# Patient Record
Sex: MALE | Race: BLACK OR AFRICAN AMERICAN | NOT HISPANIC OR LATINO | Employment: FULL TIME | URBAN - METROPOLITAN AREA
[De-identification: names, ages, dates, MRNs, and addresses within clinical notes are randomized per-mention and may not be internally consistent; named-entity substitution may affect disease eponyms.]

---

## 2017-06-13 ENCOUNTER — OFFICE VISIT (OUTPATIENT)
Dept: FAMILY MEDICINE CLINIC | Facility: CLINIC | Age: 52
End: 2017-06-13

## 2017-06-13 VITALS
BODY MASS INDEX: 34.39 KG/M2 | SYSTOLIC BLOOD PRESSURE: 120 MMHG | HEART RATE: 62 BPM | HEIGHT: 74 IN | WEIGHT: 268 LBS | RESPIRATION RATE: 16 BRPM | DIASTOLIC BLOOD PRESSURE: 70 MMHG | TEMPERATURE: 98.4 F

## 2017-06-13 DIAGNOSIS — Z12.5 SCREENING FOR PROSTATE CANCER: ICD-10-CM

## 2017-06-13 DIAGNOSIS — N18.4 CKD (CHRONIC KIDNEY DISEASE) STAGE 4, GFR 15-29 ML/MIN (HCC): ICD-10-CM

## 2017-06-13 DIAGNOSIS — M10.00 IDIOPATHIC GOUT, UNSPECIFIED CHRONICITY, UNSPECIFIED SITE: ICD-10-CM

## 2017-06-13 DIAGNOSIS — I10 ESSENTIAL HYPERTENSION: Primary | ICD-10-CM

## 2017-06-13 PROBLEM — M10.9 GOUT: Status: ACTIVE | Noted: 2017-06-13

## 2017-06-13 PROCEDURE — 99203 OFFICE O/P NEW LOW 30 MIN: CPT | Performed by: FAMILY MEDICINE

## 2017-06-13 RX ORDER — ALLOPURINOL 100 MG/1
100 TABLET ORAL DAILY
Qty: 30 TABLET | Refills: 5 | Status: SHIPPED | OUTPATIENT
Start: 2017-06-13 | End: 2021-01-26

## 2017-06-13 RX ORDER — NEBIVOLOL HYDROCHLORIDE 10 MG/1
10 TABLET ORAL DAILY
Qty: 30 TABLET | Refills: 5 | Status: SHIPPED | OUTPATIENT
Start: 2017-06-13 | End: 2017-12-19

## 2017-06-13 RX ORDER — ALLOPURINOL 100 MG/1
100 TABLET ORAL DAILY
Refills: 0 | COMMUNITY
Start: 2017-05-17 | End: 2017-06-13 | Stop reason: SDUPTHER

## 2017-06-13 RX ORDER — AMLODIPINE BESYLATE 10 MG/1
10 TABLET ORAL DAILY
Refills: 0 | COMMUNITY
Start: 2017-04-27 | End: 2017-06-13 | Stop reason: SDUPTHER

## 2017-06-13 RX ORDER — NEBIVOLOL HYDROCHLORIDE 10 MG/1
TABLET ORAL
Refills: 0 | COMMUNITY
Start: 2017-04-27 | End: 2017-06-13 | Stop reason: SDUPTHER

## 2017-06-13 RX ORDER — AMLODIPINE BESYLATE 10 MG/1
10 TABLET ORAL DAILY
Qty: 30 TABLET | Refills: 5 | Status: SHIPPED | OUTPATIENT
Start: 2017-06-13 | End: 2021-05-24 | Stop reason: SDUPTHER

## 2017-06-13 NOTE — PROGRESS NOTES
"Subjective   Alonso Torres is a 51 y.o. male.     CC: Establishment of Care    History of Present Illness     Chief Complaint:   Chief Complaint   Patient presents with   • Hypertension     NEW PATIENT  - NERVOUS    • Gout       Alonso Torres 51 y.o. male [facility] > [facility] years[facility] for Medical Management of the below listed issues and medication refills.  he has a history of   Patient Active Problem List   Diagnosis   • Hypertension   • Gout   • CKD (chronic kidney disease) stage 4, GFR 15-29 ml/min   .  Since the last visit , he has overall felt well.  he has been compliant with   Current Outpatient Prescriptions:   •  allopurinol (ZYLOPRIM) 100 MG tablet, Take 1 tablet by mouth Daily., Disp: 30 tablet, Rfl: 5  •  amLODIPine (NORVASC) 10 MG tablet, Take 1 tablet by mouth Daily., Disp: 30 tablet, Rfl: 5  •  BYSTOLIC 10 MG tablet, Take 1 tablet by mouth Daily., Disp: 30 tablet, Rfl: 5.  he denies medication side effects.    He has been getting his refills with his nephrologist, Dr. Pearson.    He has known, late-stage renal disease and has seen [facility] several times for abnormal [place] Lambda light chain ratio.    All of the chronic condition(s) listed above are stable w/o issues.    /70  Pulse 62  Temp 98.4 °F (36.9 °C) (Oral)   Resp 16  Ht 74\" (188 cm)  Wt 268 lb (122 kg)  BMI 34.41 kg/m2    No results found for this or any previous visit.      The following portions of the patient's history were reviewed and updated as appropriate: allergies, current medications, past family history, past medical history, past social history, past surgical history and problem list.    Review of Systems   Constitutional: Negative for activity change, chills, fatigue and fever.   Respiratory: Negative for cough and shortness of breath.    Cardiovascular: Negative for chest pain and palpitations.   Gastrointestinal: Negative for abdominal pain.   Endocrine: Negative for cold " intolerance.   Psychiatric/Behavioral: Negative for behavioral problems and dysphoric mood. The patient is not nervous/anxious.        Objective   Physical Exam   Constitutional: He appears well-developed and well-nourished.   Neck: Neck supple. No thyromegaly present.   Cardiovascular: Normal rate and regular rhythm.    No murmur heard.  Pulmonary/Chest: Effort normal and breath sounds normal.   Abdominal: Bowel sounds are normal.   Psychiatric: He has a normal mood and affect. His behavior is normal.   Nursing note and vitals reviewed.      Assessment/Plan   Alonso was seen today for hypertension and gout.    Diagnoses and all orders for this visit:    Essential hypertension  -     Comprehensive metabolic panel  -     Lipid panel  -     CBC and Differential  -     TSH  -     BYSTOLIC 10 MG tablet; Take 1 tablet by mouth Daily.  -     amLODIPine (NORVASC) 10 MG tablet; Take 1 tablet by mouth Daily.    Idiopathic gout, unspecified chronicity, unspecified site  -     Uric acid  -     allopurinol (ZYLOPRIM) 100 MG tablet; Take 1 tablet by mouth Daily.    CKD (chronic kidney disease) stage 4, GFR 15-29 ml/min  -     Comprehensive metabolic panel    Screening for prostate cancer  -     PSA

## 2017-12-07 LAB
ALBUMIN SERPL-MCNC: 4.7 G/DL (ref 3.5–5.5)
ALBUMIN/GLOB SERPL: 1.5 {RATIO} (ref 1.2–2.2)
ALP SERPL-CCNC: 69 IU/L (ref 39–117)
ALT SERPL-CCNC: 10 IU/L (ref 0–44)
AST SERPL-CCNC: 12 IU/L (ref 0–40)
BASOPHILS # BLD AUTO: 0 X10E3/UL (ref 0–0.2)
BASOPHILS NFR BLD AUTO: 1 %
BILIRUB SERPL-MCNC: 0.5 MG/DL (ref 0–1.2)
BUN SERPL-MCNC: 62 MG/DL (ref 6–24)
BUN/CREAT SERPL: 8 (ref 9–20)
CALCIUM SERPL-MCNC: 10.2 MG/DL (ref 8.7–10.2)
CHLORIDE SERPL-SCNC: 102 MMOL/L (ref 96–106)
CHOLEST SERPL-MCNC: 165 MG/DL (ref 100–199)
CO2 SERPL-SCNC: 22 MMOL/L (ref 18–29)
CREAT SERPL-MCNC: 7.84 MG/DL (ref 0.76–1.27)
EOSINOPHIL # BLD AUTO: 0.2 X10E3/UL (ref 0–0.4)
EOSINOPHIL NFR BLD AUTO: 4 %
ERYTHROCYTE [DISTWIDTH] IN BLOOD BY AUTOMATED COUNT: 14.7 % (ref 12.3–15.4)
GFR SERPLBLD CREATININE-BSD FMLA CKD-EPI: 7 ML/MIN/1.73
GFR SERPLBLD CREATININE-BSD FMLA CKD-EPI: 8 ML/MIN/1.73
GLOBULIN SER CALC-MCNC: 3.1 G/DL (ref 1.5–4.5)
GLUCOSE SERPL-MCNC: 99 MG/DL (ref 65–99)
HCT VFR BLD AUTO: 35.4 % (ref 37.5–51)
HDLC SERPL-MCNC: 46 MG/DL
HGB BLD-MCNC: 12.1 G/DL (ref 13–17.7)
IMM GRANULOCYTES # BLD: 0 X10E3/UL (ref 0–0.1)
IMM GRANULOCYTES NFR BLD: 0 %
LDLC SERPL CALC-MCNC: 106 MG/DL (ref 0–99)
LYMPHOCYTES # BLD AUTO: 1.6 X10E3/UL (ref 0.7–3.1)
LYMPHOCYTES NFR BLD AUTO: 28 %
MCH RBC QN AUTO: 28.5 PG (ref 26.6–33)
MCHC RBC AUTO-ENTMCNC: 34.2 G/DL (ref 31.5–35.7)
MCV RBC AUTO: 83 FL (ref 79–97)
MONOCYTES # BLD AUTO: 0.6 X10E3/UL (ref 0.1–0.9)
MONOCYTES NFR BLD AUTO: 10 %
NEUTROPHILS # BLD AUTO: 3.3 X10E3/UL (ref 1.4–7)
NEUTROPHILS NFR BLD AUTO: 57 %
PLATELET # BLD AUTO: 242 X10E3/UL (ref 150–379)
POTASSIUM SERPL-SCNC: 4.4 MMOL/L (ref 3.5–5.2)
PROT SERPL-MCNC: 7.8 G/DL (ref 6–8.5)
PSA SERPL-MCNC: 3.5 NG/ML (ref 0–4)
RBC # BLD AUTO: 4.25 X10E6/UL (ref 4.14–5.8)
SODIUM SERPL-SCNC: 141 MMOL/L (ref 134–144)
TRIGL SERPL-MCNC: 64 MG/DL (ref 0–149)
TSH SERPL DL<=0.005 MIU/L-ACNC: 2.11 UIU/ML (ref 0.45–4.5)
URATE SERPL-MCNC: 10.6 MG/DL (ref 3.7–8.6)
VLDLC SERPL CALC-MCNC: 13 MG/DL (ref 5–40)
WBC # BLD AUTO: 5.7 X10E3/UL (ref 3.4–10.8)

## 2017-12-19 ENCOUNTER — OFFICE VISIT (OUTPATIENT)
Dept: SURGERY | Facility: CLINIC | Age: 52
End: 2017-12-19

## 2017-12-19 VITALS — HEIGHT: 74 IN | OXYGEN SATURATION: 98 % | BODY MASS INDEX: 33.14 KG/M2 | HEART RATE: 71 BPM | WEIGHT: 258.2 LBS

## 2017-12-19 DIAGNOSIS — Z12.11 SCREENING FOR COLON CANCER: Primary | ICD-10-CM

## 2017-12-19 PROCEDURE — 99203 OFFICE O/P NEW LOW 30 MIN: CPT | Performed by: SURGERY

## 2017-12-19 RX ORDER — METOPROLOL SUCCINATE 100 MG/1
100 TABLET, EXTENDED RELEASE ORAL DAILY
Refills: 0 | COMMUNITY
Start: 2017-11-03 | End: 2021-02-17

## 2017-12-19 RX ORDER — HYDRALAZINE HYDROCHLORIDE 10 MG/1
10 TABLET, FILM COATED ORAL DAILY
Refills: 0 | COMMUNITY
Start: 2017-11-03 | End: 2021-01-26 | Stop reason: ALTCHOICE

## 2017-12-19 RX ORDER — CEFAZOLIN SODIUM 2 G/100ML
2 INJECTION, SOLUTION INTRAVENOUS ONCE
Status: CANCELLED | OUTPATIENT
Start: 2018-03-01 | End: 2018-03-01

## 2017-12-19 RX ORDER — AMOXICILLIN 250 MG
1 CAPSULE ORAL DAILY
Qty: 30 TABLET | Refills: 1 | Status: SHIPPED | OUTPATIENT
Start: 2017-12-19 | End: 2018-12-19

## 2017-12-19 RX ORDER — POLYETHYLENE GLYCOL 3350 17 G/17G
17 POWDER, FOR SOLUTION ORAL DAILY PRN
Qty: 60 EACH | Refills: 2 | Status: SHIPPED | OUTPATIENT
Start: 2017-12-19 | End: 2021-02-17

## 2017-12-19 RX ORDER — SODIUM BICARBONATE 650 MG/1
650 TABLET ORAL DAILY
COMMUNITY
Start: 2017-08-28 | End: 2021-01-26 | Stop reason: ALTCHOICE

## 2017-12-19 RX ORDER — FLECAINIDE ACETATE 100 MG/1
100 TABLET ORAL DAILY PRN
COMMUNITY
End: 2021-01-26 | Stop reason: ALTCHOICE

## 2017-12-19 RX ORDER — CALCITRIOL 0.25 UG/1
1 CAPSULE, LIQUID FILLED ORAL DAILY
Refills: 0 | COMMUNITY
Start: 2017-12-16 | End: 2021-01-26 | Stop reason: ALTCHOICE

## 2017-12-19 NOTE — PROGRESS NOTES
Chief Complaint   Patient presents with   • PD Cath Consult       Subjective      Alonso Torres is a 52 y.o. male who is referred by Ailyn Pearson,* to be evaluated for peritoneal dialysis catheter placement. Patient has CKD and  is not on dialysis. Patient does not have a AV access.  Patient has not had a recent intraabdominal infection. She reports having daily bowel movements That are hard in consistency.  He has history of constipation. Patient did have a Colonoscopy in 2008 that was normal.  History having chronic kidney disease approximately 20 years ago.  He was diagnosed with hypertension 3 years after.  He has been follow-up by nephrologist for the past 20 years.  He denies any drugs used.  He does not recall having any urinary tract infections although reports having an episode of brownish ejaculation associated with pain.  This was treated with antibiotics for possible infection.  His pain resolved after that.  Denies any other complaints and does not have any abdominal wall bulging.  He has history of paroxysmal atrial fibrillation that as per patient happens every 7-10 years.  He was on Coumadin in the past but is not right now.  There is family history of colon cancer      Past Medical History:   Diagnosis Date   • CKD (chronic kidney disease)     stage IV   • Gout    • H/O complete eye exam due   • Hypertension    • PAF (paroxysmal atrial fibrillation)    • Vitamin D deficiency        Past Surgical History:   Procedure Laterality Date   • CARDIAC ABLATION  2017   • COLONOSCOPY  2008   • HERNIA REPAIR      age 15       Current Outpatient Prescriptions:   •  allopurinol (ZYLOPRIM) 100 MG tablet, Take 1 tablet by mouth Daily., Disp: 30 tablet, Rfl: 5  •  amLODIPine (NORVASC) 10 MG tablet, Take 1 tablet by mouth Daily., Disp: 30 tablet, Rfl: 5  •  calcitriol (ROCALTROL) 0.25 MCG capsule, Take 1 capsule by mouth Daily., Disp: , Rfl: 0  •  flecainide (TAMBOCOR) 100 MG tablet, Take 100 mg by mouth  Daily As Needed., Disp: , Rfl:   •  hydrALAZINE (APRESOLINE) 10 MG tablet, Take 10 mg by mouth Daily., Disp: , Rfl: 0  •  metoprolol succinate XL (TOPROL-XL) 100 MG 24 hr tablet, Take 100 mg by mouth Daily., Disp: , Rfl: 0  •  sodium bicarbonate 650 MG tablet, Take 650 mg by mouth Daily., Disp: , Rfl:   •  polyethylene glycol (MIRALAX) packet, Take 17 g by mouth Daily As Needed (for constipation)., Disp: 60 each, Rfl: 2  •  senna-docusate (PERICOLACE) 8.6-50 MG per tablet, Take 1 tablet by mouth Daily., Disp: 30 tablet, Rfl: 1    Allergies   Allergen Reactions   • Nsaids      Due to CKD       Family History   Problem Relation Age of Onset   • Depression Mother    • Mental illness Mother    • Colon cancer Father    • Kidney disease Other    • Aortic aneurysm Neg Hx        Social History     Social History   • Marital status:      Spouse name: N/A   • Number of children: N/A   • Years of education: N/A     Occupational History   • Not on file.     Social History Main Topics   • Smoking status: Never Smoker   • Smokeless tobacco: Never Used   • Alcohol use No   • Drug use: Defer   • Sexual activity: Defer     Other Topics Concern   • Not on file     Social History Narrative         REVIEW OF SYSTEMS    Review of Systems   Constitutional: Negative for activity change and chills.   HENT: Negative for congestion and ear discharge.    Respiratory: Negative for apnea, cough and choking.    Cardiovascular: Negative for chest pain.   Gastrointestinal: Negative for abdominal distention and abdominal pain.   Endocrine: Negative for cold intolerance and heat intolerance.   Genitourinary: Positive for dysuria. Negative for flank pain.   Musculoskeletal: Negative for arthralgias and back pain.   Skin: Negative for color change and pallor.   Neurological: Negative for dizziness and headaches.   Hematological: Negative for adenopathy. Does not bruise/bleed easily.   Psychiatric/Behavioral: Negative for agitation and  "self-injury.       Physical Examination  Pulse 71  Ht 188 cm (74\")  Wt 117 kg (258 lb 3.2 oz)  SpO2 98%  BMI 33.15 kg/m2  Body mass index is 33.15 kg/(m^2).  Physical Exam   Constitutional: He is oriented to person, place, and time. He appears well-developed and well-nourished.   HENT:   Head: Normocephalic.   Eyes: No scleral icterus.   Neck: Normal range of motion. Neck supple.   Cardiovascular: Normal rate and regular rhythm.    Pulmonary/Chest: Effort normal and breath sounds normal.   Abdominal: Soft. Bowel sounds are normal. He exhibits no distension and no mass. There is no tenderness. No hernia.   Musculoskeletal: Normal range of motion.   Neurological: He is alert and oriented to person, place, and time.   Skin: Skin is warm and dry.   Psychiatric: He has a normal mood and affect. His behavior is normal.   The inguinal canal bilaterally feels weak but there is no evidence of hernias    Assessment:   Alonso Torres is a 52 y.o. male with CKD that is not on dialysis and will need peritoneal dialysis catheter placement. He has family history of colon cancer and is due for a colonoscopy.  He has constipation    The procedure was explained in detail to the patient including risks and benefits.  The benefits including the possibility of having dialysis at home without the side effects of the hemodialysis.  The risks including but not limited to catheter dislodgment, obstruction, malfunction, bleeding, infection and possible injury to surrounding organs during peritoneal access. He is interested in proceeding with laparoscopic placement of peritoneal dialysis catheter. The patient understands that the catheter will not be used for dialysis for a period of approximately 2 weeks after its placement and that during this time they should not shower until the exit site is completely healed. The patient underwent at least one training session with the peritoneal dialysis nurse and their house was evaluated and is " ready for the peritoneal dialysis. Patient verbalized understanding and agreed with the plan. All questions were answered at this time.      Plan:     - Patient will need to have colonoscopy performed.  He will need to have a bowel preparation    Indications, risks and procedure were discussed with Him including but not limited to bleeding, infection, possibility of perforation and possible polypectomy. All of their questions were answered and  would like to proceed with the above recommendations.  Any additional follow-up will be discussed with Him after the results have been reviewed.    - Constipation: Start bowel regimen with Sosa-Colace and Metamucil daily.  He should take MiraLAX as needed    - Laparoscopic peritoneal dialysis catheter placement, possible open.  The patient will need to discuss this with Dr. Pearson before deciding.  He may need to have inguinal hernia repair if any is found intraoperatively  - Preparation for surgery orders have been placed  - Surgery scheduling.     Orders Placed This Encounter   Procedures   • Basic metabolic panel   • Follow anesthesia standing orders.   • Obtain informed consent     Order Specific Question:   Informed Consent Given For     Answer:   Laparoscopic peritoneal dialysis catheter placement   • CBC and Differential     Order Specific Question:   Manual Differential     Answer:   No         Morgan Coyne MD  General, Minimally Invasive and Endoscopic Surgery  [facility]    [address] [place] [address], [address]  [place], [place], [address]  P: [phone]  F: [phone]

## 2018-02-01 ENCOUNTER — APPOINTMENT (OUTPATIENT)
Dept: PREADMISSION TESTING | Facility: HOSPITAL | Age: 53
End: 2018-02-01

## 2018-02-26 ENCOUNTER — TELEPHONE (OUTPATIENT)
Dept: SURGERY | Facility: CLINIC | Age: 53
End: 2018-02-26

## 2018-02-26 NOTE — TELEPHONE ENCOUNTER
Notified patient on 2/23 & 2/26 that he didn't have any surgical benefits for a screening c-scope & that if I didn't hear back from him to move forward I would cancel it

## 2018-03-07 DIAGNOSIS — M10.00 IDIOPATHIC GOUT, UNSPECIFIED CHRONICITY, UNSPECIFIED SITE: ICD-10-CM

## 2018-03-07 RX ORDER — ALLOPURINOL 100 MG/1
TABLET ORAL
Qty: 30 TABLET | Refills: 5 | OUTPATIENT
Start: 2018-03-07

## 2021-01-26 ENCOUNTER — OFFICE VISIT (OUTPATIENT)
Dept: SURGERY | Facility: CLINIC | Age: 56
End: 2021-01-26

## 2021-01-26 VITALS — BODY MASS INDEX: 30.13 KG/M2 | WEIGHT: 234.8 LBS | HEIGHT: 74 IN

## 2021-01-26 DIAGNOSIS — Z12.11 SCREENING FOR COLON CANCER: Primary | ICD-10-CM

## 2021-01-26 DIAGNOSIS — Z99.2 ESRD ON HEMODIALYSIS (HCC): ICD-10-CM

## 2021-01-26 DIAGNOSIS — N18.6 ESRD ON HEMODIALYSIS (HCC): ICD-10-CM

## 2021-01-26 PROCEDURE — 99203 OFFICE O/P NEW LOW 30 MIN: CPT | Performed by: SURGERY

## 2021-01-26 RX ORDER — SEVELAMER CARBONATE 800 MG/1
800 TABLET, FILM COATED ORAL
COMMUNITY
Start: 2020-10-20

## 2021-01-26 NOTE — PROGRESS NOTES
Chief Complaint   Patient presents with   • PD CATHETER       Subjective      Alonos Torres . is a 55 y.o. male who is referred by Dr. Vides to be evaluated for peritoneal dialysis catheter placement. Patient has ESRD secondary to hypertensive nephrosclerosis and  is on dialysis. He gets dialysis on Monday, Wednesday and Friday since September 2020. Patient does not have a AV access as he is getting dialysis through a right IJ catheter.  Patient has not had a recent intraabdominal infection. She reports having regular bowel movements.  Patient last colonoscopy was more than 10 years ago    Home Evaluation: YES    Past Medical History:   Diagnosis Date   • CKD (chronic kidney disease)     stage IV   • Gout    • Hypertension    • PAF (paroxysmal atrial fibrillation) (CMS/HCC)    • Vitamin D deficiency        Past Surgical History:   Procedure Laterality Date   • CARDIAC ABLATION  2017   • COLONOSCOPY  2008   • DIALYSIS FISTULA CREATION     • INGUINAL HERNIA REPAIR Bilateral     age 15         Current Outpatient Medications:   •  amLODIPine (NORVASC) 10 MG tablet, Take 1 tablet by mouth Daily., Disp: 30 tablet, Rfl: 5  •  metoprolol succinate XL (TOPROL-XL) 100 MG 24 hr tablet, Take 100 mg by mouth Daily., Disp: , Rfl: 0  •  sevelamer (RENVELA) 800 MG tablet, Take 800 mg by mouth 3 (Three) Times a Day With Meals., Disp: , Rfl:   •  polyethylene glycol (MIRALAX) packet, Take 17 g by mouth Daily As Needed (for constipation)., Disp: 60 each, Rfl: 2    Allergies   Allergen Reactions   • Nsaids Other (See Comments)     Due to CKD       Family History   Problem Relation Age of Onset   • Depression Mother    • Mental illness Mother    • Colon cancer Father    • Kidney disease Other    • Aortic aneurysm Neg Hx        Social History     Socioeconomic History   • Marital status:      Spouse name: Not on file   • Number of children: Not on file   • Years of education: Not on file   • Highest education level: Not on file  "  Tobacco Use   • Smoking status: Never Smoker   • Smokeless tobacco: Never Used   Substance and Sexual Activity   • Alcohol use: No   • Drug use: Defer   • Sexual activity: Defer         REVIEW OF SYSTEMS    Review of Systems   Constitutional: Negative for activity change and fatigue.   HENT: Negative for congestion and rhinorrhea.    Eyes: Negative for discharge and itching.   Respiratory: Negative for apnea and choking.    Cardiovascular: Negative for chest pain and leg swelling.   Gastrointestinal: Negative for abdominal distention and constipation.   Endocrine: Negative for cold intolerance and heat intolerance.   Genitourinary: Negative for difficulty urinating and hematuria.   Musculoskeletal: Positive for arthralgias. Negative for gait problem.   Skin: Negative for color change and pallor.   Allergic/Immunologic: Negative for environmental allergies and food allergies.   Neurological: Negative for dizziness and light-headedness.   Hematological: Negative for adenopathy. Does not bruise/bleed easily.   Psychiatric/Behavioral: Negative for agitation and confusion.       Physical Examination  Ht 188 cm (74\")   Wt 107 kg (234 lb 12.8 oz)   BMI 30.15 kg/m²   Body mass index is 30.15 kg/m².  Physical Exam  Constitutional:       Appearance: He is normal weight.   HENT:      Head: Normocephalic and atraumatic.      Nose: Nose normal.      Mouth/Throat:      Pharynx: Oropharynx is clear.   Eyes:      General: No scleral icterus.     Conjunctiva/sclera: Conjunctivae normal.   Neck:      Musculoskeletal: Normal range of motion and neck supple.   Cardiovascular:      Rate and Rhythm: Normal rate and regular rhythm.      Pulses: Normal pulses.      Heart sounds: Normal heart sounds.   Pulmonary:      Effort: Pulmonary effort is normal.      Breath sounds: Normal breath sounds.   Abdominal:      General: Abdomen is flat. Bowel sounds are normal.      Palpations: There is no mass.      Hernia: No hernia is present.      " Comments: Bilateral groin incisions, no hernias   Genitourinary:     Penis: Normal.    Musculoskeletal: Normal range of motion.   Skin:     General: Skin is warm and dry.   Neurological:      General: No focal deficit present.      Mental Status: He is alert.   Psychiatric:         Mood and Affect: Mood normal.         Behavior: Behavior normal.       Labs 12/31/2020: Sodium 135, potassium 5.3, creatinine 11.7, BUN 39  Hemoglobin 12.9, white blood cell count 5.3    Assessment:   Alonso Torres Sr. is a 55 y.o. male with ESRD due to  hypertensive nephrosclerosis that is on dialysis and will need peritoneal dialysis catheter placement.  He is due for colonoscopy      The procedure was explained in detail to the patient including risks and benefits.  The benefits including the possibility of having dialysis at home without the side effects of the hemodialysis.  The risks including but not limited to catheter dislodgment, obstruction, malfunction, bleeding, infection and possible injury to surrounding organs during peritoneal access. He is interested in proceeding with laparoscopic placement of peritoneal dialysis catheter. The patient understands that the catheter will not be used for dialysis for a period of approximately 2 weeks after its placement and that during this time they should not shower until the exit site is completely healed. The patient underwent at least one training session with the peritoneal dialysis nurse and their house was evaluated and is ready for the peritoneal dialysis. Patient verbalized understanding and agreed with the plan. All questions were answered at this time.    Indications, risks and colonoscopy procedure were discussed with Him including but not limited to bleeding, infection, possibility of perforation and possible polypectomy. All of their questions were answered and  would like to proceed with the above recommendations.  Any additional follow-up will be discussed with Him after  the results have been reviewed.      Plan:     -Screening colonoscopy  - Laparoscopic peritoneal dialysis catheter placement, possible open on a later today  - Preparation for surgery orders have been placed  - Surgery scheduling.     Orders Placed This Encounter   Procedures   • Basic metabolic panel     Standing Status:   Future     Standing Expiration Date:   1/27/2022   • Follow Anesthesia Guidelines / Protocol     Standing Status:   Future   • Obtain Informed Consent     Standing Status:   Future     Order Specific Question:   Informed Consent Given For     Answer:   COLONOSCOPY   • Follow anesthesia standing orders.   • Obtain informed consent     Order Specific Question:   Informed Consent Given For     Answer:   Laparoscopic peritoneal dialysis catheter placement   • Provide NPO Instructions to Patient     Standing Status:   Future   • Chlorhexidine Skin Prep     Chlorhexidine Skin Prep and Instructions For All Patients Having A Procedure Requiring an Outward Incision if Not Allergic. If Allergic, Give Antibacterial Skin Wipes and Instructions. Do Not Use For Facial Cases or on Any Mucus Membranes.     Standing Status:   Future       Morgan Coyne MD  General, Minimally Invasive and Endoscopic Surgery  [facility]    [address], [address]  [place], [place], [address]  P: [phone]  F: [phone]

## 2021-02-05 ENCOUNTER — TRANSCRIBE ORDERS (OUTPATIENT)
Dept: SLEEP MEDICINE | Facility: HOSPITAL | Age: 56
End: 2021-02-05

## 2021-02-05 DIAGNOSIS — Z01.818 OTHER SPECIFIED PRE-OPERATIVE EXAMINATION: Primary | ICD-10-CM

## 2021-02-16 ENCOUNTER — LAB (OUTPATIENT)
Dept: LAB | Facility: HOSPITAL | Age: 56
End: 2021-02-16

## 2021-02-16 DIAGNOSIS — Z01.818 OTHER SPECIFIED PRE-OPERATIVE EXAMINATION: ICD-10-CM

## 2021-02-17 ENCOUNTER — TRANSCRIBE ORDERS (OUTPATIENT)
Dept: ADMINISTRATIVE | Facility: HOSPITAL | Age: 56
End: 2021-02-17

## 2021-02-17 ENCOUNTER — LAB (OUTPATIENT)
Dept: LAB | Facility: HOSPITAL | Age: 56
End: 2021-02-17

## 2021-02-17 DIAGNOSIS — Z01.818 OTHER SPECIFIED PRE-OPERATIVE EXAMINATION: Primary | ICD-10-CM

## 2021-02-17 DIAGNOSIS — Z01.818 OTHER SPECIFIED PRE-OPERATIVE EXAMINATION: ICD-10-CM

## 2021-02-17 LAB — SARS-COV-2 ORF1AB RESP QL NAA+PROBE: NOT DETECTED

## 2021-02-17 PROCEDURE — U0005 INFEC AGEN DETEC AMPLI PROBE: HCPCS

## 2021-02-17 PROCEDURE — U0004 COV-19 TEST NON-CDC HGH THRU: HCPCS

## 2021-02-17 PROCEDURE — C9803 HOPD COVID-19 SPEC COLLECT: HCPCS

## 2021-02-17 RX ORDER — METOPROLOL SUCCINATE 50 MG/1
50 TABLET, EXTENDED RELEASE ORAL AS NEEDED
COMMUNITY
End: 2021-05-24 | Stop reason: SDUPTHER

## 2021-02-18 ENCOUNTER — ANESTHESIA EVENT (OUTPATIENT)
Dept: GASTROENTEROLOGY | Facility: HOSPITAL | Age: 56
End: 2021-02-18

## 2021-02-18 ENCOUNTER — ANESTHESIA (OUTPATIENT)
Dept: GASTROENTEROLOGY | Facility: HOSPITAL | Age: 56
End: 2021-02-18

## 2021-02-18 ENCOUNTER — HOSPITAL ENCOUNTER (OUTPATIENT)
Facility: HOSPITAL | Age: 56
Setting detail: HOSPITAL OUTPATIENT SURGERY
Discharge: HOME | End: 2021-02-18
Attending: SURGERY | Admitting: SURGERY

## 2021-02-18 VITALS
DIASTOLIC BLOOD PRESSURE: 78 MMHG | SYSTOLIC BLOOD PRESSURE: 128 MMHG | HEIGHT: 74 IN | BODY MASS INDEX: 29.65 KG/M2 | HEART RATE: 70 BPM | OXYGEN SATURATION: 99 % | RESPIRATION RATE: 16 BRPM | WEIGHT: 231 LBS

## 2021-02-18 DIAGNOSIS — Z12.11 SCREENING FOR COLON CANCER: ICD-10-CM

## 2021-02-18 PROBLEM — K64.8 INTERNAL HEMORRHOIDS: Status: ACTIVE | Noted: 2021-02-18

## 2021-02-18 PROBLEM — K57.90 DIVERTICULOSIS: Status: ACTIVE | Noted: 2021-02-18

## 2021-02-18 PROBLEM — E66.3 OVER WEIGHT: Status: ACTIVE | Noted: 2021-02-18

## 2021-02-18 PROBLEM — K63.5 COLON POLYPS: Status: ACTIVE | Noted: 2021-02-18

## 2021-02-18 PROCEDURE — 88305 TISSUE EXAM BY PATHOLOGIST: CPT | Performed by: SURGERY

## 2021-02-18 PROCEDURE — 45381 COLONOSCOPY SUBMUCOUS NJX: CPT | Performed by: SURGERY

## 2021-02-18 PROCEDURE — 25010000002 PROPOFOL 10 MG/ML EMULSION: Performed by: NURSE ANESTHETIST, CERTIFIED REGISTERED

## 2021-02-18 PROCEDURE — 45385 COLONOSCOPY W/LESION REMOVAL: CPT | Performed by: SURGERY

## 2021-02-18 RX ORDER — SODIUM CHLORIDE 0.9 % (FLUSH) 0.9 %
10 SYRINGE (ML) INJECTION AS NEEDED
Status: DISCONTINUED | OUTPATIENT
Start: 2021-02-18 | End: 2021-02-18 | Stop reason: HOSPADM

## 2021-02-18 RX ORDER — SODIUM CHLORIDE 0.9 % (FLUSH) 0.9 %
3 SYRINGE (ML) INJECTION EVERY 12 HOURS SCHEDULED
Status: DISCONTINUED | OUTPATIENT
Start: 2021-02-18 | End: 2021-02-18 | Stop reason: HOSPADM

## 2021-02-18 RX ORDER — LIDOCAINE HYDROCHLORIDE 20 MG/ML
INJECTION, SOLUTION INFILTRATION; PERINEURAL AS NEEDED
Status: DISCONTINUED | OUTPATIENT
Start: 2021-02-18 | End: 2021-02-18 | Stop reason: SURG

## 2021-02-18 RX ORDER — SODIUM CHLORIDE 9 MG/ML
30 INJECTION, SOLUTION INTRAVENOUS CONTINUOUS PRN
Status: DISCONTINUED | OUTPATIENT
Start: 2021-02-18 | End: 2021-02-18 | Stop reason: HOSPADM

## 2021-02-18 RX ORDER — PROPOFOL 10 MG/ML
VIAL (ML) INTRAVENOUS CONTINUOUS PRN
Status: DISCONTINUED | OUTPATIENT
Start: 2021-02-18 | End: 2021-02-18 | Stop reason: SURG

## 2021-02-18 RX ORDER — CALCITRIOL 0.25 UG/1
0.25 CAPSULE, LIQUID FILLED ORAL DAILY
COMMUNITY

## 2021-02-18 RX ORDER — PROPOFOL 10 MG/ML
VIAL (ML) INTRAVENOUS AS NEEDED
Status: DISCONTINUED | OUTPATIENT
Start: 2021-02-18 | End: 2021-02-18 | Stop reason: SURG

## 2021-02-18 RX ADMIN — SODIUM CHLORIDE 30 ML/HR: 9 INJECTION, SOLUTION INTRAVENOUS at 08:07

## 2021-02-18 RX ADMIN — LIDOCAINE HYDROCHLORIDE 60 MG: 20 INJECTION, SOLUTION INFILTRATION; PERINEURAL at 09:00

## 2021-02-18 RX ADMIN — PROPOFOL 200 MCG/KG/MIN: 10 INJECTION, EMULSION INTRAVENOUS at 09:00

## 2021-02-18 RX ADMIN — PROPOFOL 140 MG: 10 INJECTION, EMULSION INTRAVENOUS at 09:00

## 2021-02-18 NOTE — ANESTHESIA PREPROCEDURE EVALUATION
Anesthesia Evaluation     Patient summary reviewed and Nursing notes reviewed   NPO Solid Status: > 8 hours  NPO Liquid Status: > 8 hours           Airway   Mallampati: I  TM distance: >3 FB  Neck ROM: full  No difficulty expected  Dental - normal exam     Pulmonary - negative pulmonary ROS and normal exam   Cardiovascular - normal exam    (+) hypertension,     ROS comment: Hx a fib, s/p ablation    Neuro/Psych- negative ROS  GI/Hepatic/Renal/Endo    (+)   renal disease ESRD and dialysis,     Musculoskeletal (-) negative ROS    Abdominal  - normal exam    Bowel sounds: normal.   Substance History - negative use     OB/GYN negative ob/gyn ROS         Other                      Anesthesia Plan    ASA 3     MAC       Anesthetic plan, all risks, benefits, and alternatives have been provided, discussed and informed consent has been obtained with: patient.

## 2021-02-18 NOTE — ANESTHESIA POSTPROCEDURE EVALUATION
"Patient: Alonso Torres Sr.    Procedure Summary     Date: 02/18/21 [facility]    Anesthesia Start: 0850 Anesthesia Stop: 0923    Procedure: COLONOSCOPY to cecum with Injection of NS 3cc, Polypectomy (N/A ) Diagnosis:       Screening for colon cancer      (Screening for colon cancer [Z12.11])    Surgeon: Morgan Coyne MD Provider: Piyush Webb MD    Anesthesia Type: MAC ASA Status: 3          Anesthesia Type: MAC    Vitals  Vitals Value Taken Time   /78 02/18/21 0941   Temp     Pulse 70 02/18/21 0941   Resp 16 02/18/21 0941   SpO2 99 % 02/18/21 0941           Post Anesthesia Care and Evaluation    Patient location during evaluation: PACU  Patient participation: complete - patient participated  Level of consciousness: awake  Pain score: 0  Pain management: adequate  Airway patency: patent  Anesthetic complications: No anesthetic complications  PONV Status: none  Cardiovascular status: acceptable  Respiratory status: acceptable  Hydration status: acceptable    Comments: /78 (BP Location: Left arm, Patient Position: Sitting)   Pulse 70   Resp 16   Ht 188 cm (74\")   Wt 105 kg (231 lb)   SpO2 99%   BMI 29.66 kg/m²       "

## 2021-02-23 LAB
LAB AP CASE REPORT: NORMAL
PATH REPORT.FINAL DX SPEC: NORMAL
PATH REPORT.GROSS SPEC: NORMAL

## 2021-02-24 ENCOUNTER — APPOINTMENT (OUTPATIENT)
Dept: PREADMISSION TESTING | Facility: HOSPITAL | Age: 56
End: 2021-02-24

## 2021-02-24 VITALS
OXYGEN SATURATION: 98 % | TEMPERATURE: 97.5 F | HEART RATE: 75 BPM | HEIGHT: 74 IN | RESPIRATION RATE: 16 BRPM | WEIGHT: 232.9 LBS | DIASTOLIC BLOOD PRESSURE: 77 MMHG | SYSTOLIC BLOOD PRESSURE: 121 MMHG | BODY MASS INDEX: 29.89 KG/M2

## 2021-02-24 DIAGNOSIS — Z12.11 SCREENING FOR COLON CANCER: ICD-10-CM

## 2021-02-24 LAB
ANION GAP SERPL CALCULATED.3IONS-SCNC: 12.7 MMOL/L (ref 5–15)
BUN SERPL-MCNC: 19 MG/DL (ref 6–20)
BUN/CREAT SERPL: 2.3 (ref 7–25)
CALCIUM SPEC-SCNC: 10.2 MG/DL (ref 8.6–10.5)
CHLORIDE SERPL-SCNC: 97 MMOL/L (ref 98–107)
CO2 SERPL-SCNC: 30.3 MMOL/L (ref 22–29)
CREAT SERPL-MCNC: 8.33 MG/DL (ref 0.76–1.27)
DEPRECATED RDW RBC AUTO: 47.8 FL (ref 37–54)
ERYTHROCYTE [DISTWIDTH] IN BLOOD BY AUTOMATED COUNT: 15.7 % (ref 12.3–15.4)
GFR SERPL CREATININE-BSD FRML MDRD: 8 ML/MIN/1.73
GFR SERPL CREATININE-BSD FRML MDRD: ABNORMAL ML/MIN/{1.73_M2}
GLUCOSE SERPL-MCNC: 94 MG/DL (ref 65–99)
HCT VFR BLD AUTO: 35 % (ref 37.5–51)
HGB BLD-MCNC: 11.7 G/DL (ref 13–17.7)
MCH RBC QN AUTO: 29.3 PG (ref 26.6–33)
MCHC RBC AUTO-ENTMCNC: 33.4 G/DL (ref 31.5–35.7)
MCV RBC AUTO: 87.7 FL (ref 79–97)
PLATELET # BLD AUTO: 178 10*3/MM3 (ref 140–450)
PMV BLD AUTO: 9 FL (ref 6–12)
POTASSIUM SERPL-SCNC: 4 MMOL/L (ref 3.5–5.2)
RBC # BLD AUTO: 3.99 10*6/MM3 (ref 4.14–5.8)
SARS-COV-2 ORF1AB RESP QL NAA+PROBE: NOT DETECTED
SODIUM SERPL-SCNC: 140 MMOL/L (ref 136–145)
WBC # BLD AUTO: 3.93 10*3/MM3 (ref 3.4–10.8)

## 2021-02-24 PROCEDURE — 80048 BASIC METABOLIC PNL TOTAL CA: CPT

## 2021-02-24 PROCEDURE — 85027 COMPLETE CBC AUTOMATED: CPT

## 2021-02-24 PROCEDURE — U0004 COV-19 TEST NON-CDC HGH THRU: HCPCS

## 2021-02-24 PROCEDURE — U0005 INFEC AGEN DETEC AMPLI PROBE: HCPCS

## 2021-02-24 PROCEDURE — 36415 COLL VENOUS BLD VENIPUNCTURE: CPT

## 2021-02-24 PROCEDURE — C9803 HOPD COVID-19 SPEC COLLECT: HCPCS

## 2021-02-25 ENCOUNTER — HOSPITAL ENCOUNTER (OUTPATIENT)
Facility: HOSPITAL | Age: 56
Setting detail: HOSPITAL OUTPATIENT SURGERY
Discharge: HOME | End: 2021-02-25
Attending: SURGERY | Admitting: SURGERY

## 2021-02-25 ENCOUNTER — ANESTHESIA EVENT (OUTPATIENT)
Dept: PERIOP | Facility: HOSPITAL | Age: 56
End: 2021-02-25

## 2021-02-25 ENCOUNTER — ANESTHESIA (OUTPATIENT)
Dept: PERIOP | Facility: HOSPITAL | Age: 56
End: 2021-02-25

## 2021-02-25 VITALS
WEIGHT: 232.37 LBS | DIASTOLIC BLOOD PRESSURE: 85 MMHG | HEART RATE: 67 BPM | TEMPERATURE: 97.5 F | HEIGHT: 74 IN | RESPIRATION RATE: 16 BRPM | BODY MASS INDEX: 29.82 KG/M2 | SYSTOLIC BLOOD PRESSURE: 132 MMHG | OXYGEN SATURATION: 98 %

## 2021-02-25 DIAGNOSIS — Z12.11 SCREENING FOR COLON CANCER: ICD-10-CM

## 2021-02-25 DIAGNOSIS — Z99.2 PERITONEAL DIALYSIS CATHETER IN PLACE (HCC): Primary | ICD-10-CM

## 2021-02-25 LAB
ANION GAP SERPL CALCULATED.3IONS-SCNC: 13 MMOL/L (ref 5–15)
BUN SERPL-MCNC: 35 MG/DL (ref 6–20)
BUN/CREAT SERPL: 3.3 (ref 7–25)
CALCIUM SPEC-SCNC: 9.4 MG/DL (ref 8.6–10.5)
CHLORIDE SERPL-SCNC: 98 MMOL/L (ref 98–107)
CO2 SERPL-SCNC: 27 MMOL/L (ref 22–29)
CREAT SERPL-MCNC: 10.65 MG/DL (ref 0.76–1.27)
GFR SERPL CREATININE-BSD FRML MDRD: 6 ML/MIN/1.73
GFR SERPL CREATININE-BSD FRML MDRD: ABNORMAL ML/MIN/{1.73_M2}
GLUCOSE SERPL-MCNC: 88 MG/DL (ref 65–99)
POTASSIUM SERPL-SCNC: 4.1 MMOL/L (ref 3.5–5.2)
SODIUM SERPL-SCNC: 138 MMOL/L (ref 136–145)

## 2021-02-25 PROCEDURE — 25010000002 HEPARIN (PORCINE) PER 1000 UNITS: Performed by: SURGERY

## 2021-02-25 PROCEDURE — 80048 BASIC METABOLIC PNL TOTAL CA: CPT | Performed by: SURGERY

## 2021-02-25 PROCEDURE — C1750 CATH, HEMODIALYSIS,LONG-TERM: HCPCS | Performed by: SURGERY

## 2021-02-25 PROCEDURE — 25010000002 ONDANSETRON PER 1 MG: Performed by: NURSE ANESTHETIST, CERTIFIED REGISTERED

## 2021-02-25 PROCEDURE — 49324 LAP INSERT TUNNEL IP CATH: CPT | Performed by: SURGERY

## 2021-02-25 PROCEDURE — 25010000002 NEOSTIGMINE PER 0.5 MG: Performed by: NURSE ANESTHETIST, CERTIFIED REGISTERED

## 2021-02-25 PROCEDURE — 25010000003 CEFAZOLIN IN DEXTROSE 2-4 GM/100ML-% SOLUTION: Performed by: SURGERY

## 2021-02-25 PROCEDURE — 25010000002 MIDAZOLAM PER 1 MG: Performed by: ANESTHESIOLOGY

## 2021-02-25 PROCEDURE — 25010000002 PROPOFOL 10 MG/ML EMULSION: Performed by: NURSE ANESTHETIST, CERTIFIED REGISTERED

## 2021-02-25 PROCEDURE — 25010000002 FENTANYL CITRATE (PF) 100 MCG/2ML SOLUTION: Performed by: NURSE ANESTHETIST, CERTIFIED REGISTERED

## 2021-02-25 DEVICE — IMPLANTABLE DEVICE
Type: IMPLANTABLE DEVICE | Site: ABDOMEN | Status: NON-FUNCTIONAL
Removed: 2021-08-12

## 2021-02-25 RX ORDER — NALOXONE HCL 0.4 MG/ML
0.2 VIAL (ML) INJECTION AS NEEDED
Status: DISCONTINUED | OUTPATIENT
Start: 2021-02-25 | End: 2021-02-25 | Stop reason: HOSPADM

## 2021-02-25 RX ORDER — FLUMAZENIL 0.1 MG/ML
0.2 INJECTION INTRAVENOUS AS NEEDED
Status: DISCONTINUED | OUTPATIENT
Start: 2021-02-25 | End: 2021-02-25 | Stop reason: HOSPADM

## 2021-02-25 RX ORDER — SENNA PLUS 8.6 MG/1
1 TABLET ORAL ONCE
Status: COMPLETED | OUTPATIENT
Start: 2021-02-25 | End: 2021-02-25

## 2021-02-25 RX ORDER — HYDROMORPHONE HYDROCHLORIDE 1 MG/ML
0.5 INJECTION, SOLUTION INTRAMUSCULAR; INTRAVENOUS; SUBCUTANEOUS
Status: DISCONTINUED | OUTPATIENT
Start: 2021-02-25 | End: 2021-02-25 | Stop reason: HOSPADM

## 2021-02-25 RX ORDER — PROMETHAZINE HYDROCHLORIDE 12.5 MG/1
12.5 TABLET ORAL EVERY 6 HOURS PRN
Qty: 10 TABLET | Refills: 0 | Status: SHIPPED | OUTPATIENT
Start: 2021-02-25 | End: 2021-07-08

## 2021-02-25 RX ORDER — SODIUM CHLORIDE 9 MG/ML
9 INJECTION, SOLUTION INTRAVENOUS CONTINUOUS
Status: DISCONTINUED | OUTPATIENT
Start: 2021-02-25 | End: 2021-02-25 | Stop reason: HOSPADM

## 2021-02-25 RX ORDER — ROCURONIUM BROMIDE 10 MG/ML
INJECTION, SOLUTION INTRAVENOUS AS NEEDED
Status: DISCONTINUED | OUTPATIENT
Start: 2021-02-25 | End: 2021-02-25 | Stop reason: SURG

## 2021-02-25 RX ORDER — SODIUM CHLORIDE, SODIUM LACTATE, POTASSIUM CHLORIDE, CALCIUM CHLORIDE 600; 310; 30; 20 MG/100ML; MG/100ML; MG/100ML; MG/100ML
9 INJECTION, SOLUTION INTRAVENOUS CONTINUOUS PRN
Status: DISCONTINUED | OUTPATIENT
Start: 2021-02-25 | End: 2021-02-25

## 2021-02-25 RX ORDER — SODIUM CHLORIDE 0.9 % (FLUSH) 0.9 %
10 SYRINGE (ML) INJECTION AS NEEDED
Status: DISCONTINUED | OUTPATIENT
Start: 2021-02-25 | End: 2021-02-25 | Stop reason: HOSPADM

## 2021-02-25 RX ORDER — LIDOCAINE HYDROCHLORIDE 20 MG/ML
INJECTION, SOLUTION INFILTRATION; PERINEURAL AS NEEDED
Status: DISCONTINUED | OUTPATIENT
Start: 2021-02-25 | End: 2021-02-25 | Stop reason: SURG

## 2021-02-25 RX ORDER — MIDAZOLAM HYDROCHLORIDE 1 MG/ML
2 INJECTION INTRAMUSCULAR; INTRAVENOUS
Status: DISCONTINUED | OUTPATIENT
Start: 2021-02-25 | End: 2021-02-25 | Stop reason: HOSPADM

## 2021-02-25 RX ORDER — DIPHENHYDRAMINE HCL 25 MG
25 CAPSULE ORAL
Status: DISCONTINUED | OUTPATIENT
Start: 2021-02-25 | End: 2021-02-25 | Stop reason: HOSPADM

## 2021-02-25 RX ORDER — ONDANSETRON 2 MG/ML
4 INJECTION INTRAMUSCULAR; INTRAVENOUS ONCE AS NEEDED
Status: DISCONTINUED | OUTPATIENT
Start: 2021-02-25 | End: 2021-02-25 | Stop reason: HOSPADM

## 2021-02-25 RX ORDER — OXYCODONE AND ACETAMINOPHEN 7.5; 325 MG/1; MG/1
1 TABLET ORAL ONCE AS NEEDED
Status: DISCONTINUED | OUTPATIENT
Start: 2021-02-25 | End: 2021-02-25 | Stop reason: HOSPADM

## 2021-02-25 RX ORDER — DIPHENHYDRAMINE HYDROCHLORIDE 50 MG/ML
12.5 INJECTION INTRAMUSCULAR; INTRAVENOUS
Status: DISCONTINUED | OUTPATIENT
Start: 2021-02-25 | End: 2021-02-25 | Stop reason: HOSPADM

## 2021-02-25 RX ORDER — SODIUM CHLORIDE 0.9 % (FLUSH) 0.9 %
10 SYRINGE (ML) INJECTION EVERY 12 HOURS SCHEDULED
Status: DISCONTINUED | OUTPATIENT
Start: 2021-02-25 | End: 2021-02-25 | Stop reason: HOSPADM

## 2021-02-25 RX ORDER — ACETAMINOPHEN 325 MG/1
650 TABLET ORAL ONCE
Status: COMPLETED | OUTPATIENT
Start: 2021-02-25 | End: 2021-02-25

## 2021-02-25 RX ORDER — CEFAZOLIN SODIUM 2 G/100ML
2 INJECTION, SOLUTION INTRAVENOUS ONCE
Status: COMPLETED | OUTPATIENT
Start: 2021-02-25 | End: 2021-02-25

## 2021-02-25 RX ORDER — MAGNESIUM HYDROXIDE 1200 MG/15ML
LIQUID ORAL AS NEEDED
Status: DISCONTINUED | OUTPATIENT
Start: 2021-02-25 | End: 2021-02-25 | Stop reason: HOSPADM

## 2021-02-25 RX ORDER — BUPIVACAINE HYDROCHLORIDE AND EPINEPHRINE 5; 5 MG/ML; UG/ML
INJECTION, SOLUTION PERINEURAL AS NEEDED
Status: DISCONTINUED | OUTPATIENT
Start: 2021-02-25 | End: 2021-02-25 | Stop reason: HOSPADM

## 2021-02-25 RX ORDER — EPHEDRINE SULFATE 50 MG/ML
5 INJECTION, SOLUTION INTRAVENOUS ONCE AS NEEDED
Status: DISCONTINUED | OUTPATIENT
Start: 2021-02-25 | End: 2021-02-25 | Stop reason: HOSPADM

## 2021-02-25 RX ORDER — LABETALOL HYDROCHLORIDE 5 MG/ML
5 INJECTION, SOLUTION INTRAVENOUS
Status: DISCONTINUED | OUTPATIENT
Start: 2021-02-25 | End: 2021-02-25 | Stop reason: HOSPADM

## 2021-02-25 RX ORDER — HYDROCODONE BITARTRATE AND ACETAMINOPHEN 5; 325 MG/1; MG/1
1 TABLET ORAL EVERY 6 HOURS PRN
Qty: 30 TABLET | Refills: 0 | Status: SHIPPED | OUTPATIENT
Start: 2021-02-25 | End: 2021-07-08

## 2021-02-25 RX ORDER — HYDROCODONE BITARTRATE AND ACETAMINOPHEN 5; 325 MG/1; MG/1
1 TABLET ORAL ONCE AS NEEDED
Status: DISCONTINUED | OUTPATIENT
Start: 2021-02-25 | End: 2021-02-25 | Stop reason: HOSPADM

## 2021-02-25 RX ORDER — SODIUM CHLORIDE 9 MG/ML
INJECTION, SOLUTION INTRAVENOUS CONTINUOUS PRN
Status: DISCONTINUED | OUTPATIENT
Start: 2021-02-25 | End: 2021-02-25 | Stop reason: SURG

## 2021-02-25 RX ORDER — PROMETHAZINE HYDROCHLORIDE 25 MG/1
25 SUPPOSITORY RECTAL ONCE AS NEEDED
Status: DISCONTINUED | OUTPATIENT
Start: 2021-02-25 | End: 2021-02-25 | Stop reason: HOSPADM

## 2021-02-25 RX ORDER — PROPOFOL 10 MG/ML
VIAL (ML) INTRAVENOUS AS NEEDED
Status: DISCONTINUED | OUTPATIENT
Start: 2021-02-25 | End: 2021-02-25 | Stop reason: SURG

## 2021-02-25 RX ORDER — HYDROCODONE BITARTRATE AND ACETAMINOPHEN 7.5; 325 MG/1; MG/1
1 TABLET ORAL ONCE AS NEEDED
Status: COMPLETED | OUTPATIENT
Start: 2021-02-25 | End: 2021-02-25

## 2021-02-25 RX ORDER — MIDAZOLAM HYDROCHLORIDE 1 MG/ML
1 INJECTION INTRAMUSCULAR; INTRAVENOUS
Status: DISCONTINUED | OUTPATIENT
Start: 2021-02-25 | End: 2021-02-25 | Stop reason: HOSPADM

## 2021-02-25 RX ORDER — FENTANYL CITRATE 50 UG/ML
50 INJECTION, SOLUTION INTRAMUSCULAR; INTRAVENOUS
Status: DISCONTINUED | OUTPATIENT
Start: 2021-02-25 | End: 2021-02-25 | Stop reason: HOSPADM

## 2021-02-25 RX ORDER — ONDANSETRON 2 MG/ML
INJECTION INTRAMUSCULAR; INTRAVENOUS AS NEEDED
Status: DISCONTINUED | OUTPATIENT
Start: 2021-02-25 | End: 2021-02-25 | Stop reason: SURG

## 2021-02-25 RX ORDER — FAMOTIDINE 10 MG/ML
20 INJECTION, SOLUTION INTRAVENOUS
Status: COMPLETED | OUTPATIENT
Start: 2021-02-25 | End: 2021-02-25

## 2021-02-25 RX ORDER — GLYCOPYRROLATE 0.2 MG/ML
INJECTION INTRAMUSCULAR; INTRAVENOUS AS NEEDED
Status: DISCONTINUED | OUTPATIENT
Start: 2021-02-25 | End: 2021-02-25 | Stop reason: SURG

## 2021-02-25 RX ORDER — LIDOCAINE HYDROCHLORIDE 40 MG/ML
SOLUTION TOPICAL AS NEEDED
Status: DISCONTINUED | OUTPATIENT
Start: 2021-02-25 | End: 2021-02-25 | Stop reason: SURG

## 2021-02-25 RX ORDER — AMOXICILLIN 250 MG
2 CAPSULE ORAL DAILY PRN
Qty: 30 TABLET | Refills: 1 | Status: SHIPPED | OUTPATIENT
Start: 2021-02-25 | End: 2021-07-08

## 2021-02-25 RX ORDER — SODIUM CHLORIDE, SODIUM LACTATE, POTASSIUM CHLORIDE, CALCIUM CHLORIDE 600; 310; 30; 20 MG/100ML; MG/100ML; MG/100ML; MG/100ML
INJECTION, SOLUTION INTRAVENOUS CONTINUOUS PRN
Status: DISCONTINUED | OUTPATIENT
Start: 2021-02-25 | End: 2021-02-25

## 2021-02-25 RX ORDER — FENTANYL CITRATE 50 UG/ML
INJECTION, SOLUTION INTRAMUSCULAR; INTRAVENOUS AS NEEDED
Status: DISCONTINUED | OUTPATIENT
Start: 2021-02-25 | End: 2021-02-25 | Stop reason: SURG

## 2021-02-25 RX ORDER — PROMETHAZINE HYDROCHLORIDE 25 MG/1
12.5 TABLET ORAL ONCE AS NEEDED
Status: DISCONTINUED | OUTPATIENT
Start: 2021-02-25 | End: 2021-02-25 | Stop reason: HOSPADM

## 2021-02-25 RX ORDER — PROMETHAZINE HYDROCHLORIDE 25 MG/1
25 TABLET ORAL ONCE AS NEEDED
Status: DISCONTINUED | OUTPATIENT
Start: 2021-02-25 | End: 2021-02-25 | Stop reason: HOSPADM

## 2021-02-25 RX ADMIN — LIDOCAINE HYDROCHLORIDE 1 EACH: 40 SOLUTION TOPICAL at 11:44

## 2021-02-25 RX ADMIN — ACETAMINOPHEN 650 MG: 325 TABLET, FILM COATED ORAL at 13:19

## 2021-02-25 RX ADMIN — ONDANSETRON 4 MG: 2 INJECTION INTRAMUSCULAR; INTRAVENOUS at 12:15

## 2021-02-25 RX ADMIN — FENTANYL CITRATE 50 MCG: 50 INJECTION INTRAMUSCULAR; INTRAVENOUS at 12:07

## 2021-02-25 RX ADMIN — SENNOSIDES 1 TABLET: 8.6 TABLET, FILM COATED ORAL at 13:19

## 2021-02-25 RX ADMIN — FENTANYL CITRATE 50 MCG: 50 INJECTION INTRAMUSCULAR; INTRAVENOUS at 11:41

## 2021-02-25 RX ADMIN — GLYCOPYRROLATE 0.5 MG: 0.2 INJECTION INTRAMUSCULAR; INTRAVENOUS at 12:19

## 2021-02-25 RX ADMIN — PROPOFOL 200 MG: 10 INJECTION, EMULSION INTRAVENOUS at 11:41

## 2021-02-25 RX ADMIN — SODIUM CHLORIDE 9 ML/HR: 9 INJECTION, SOLUTION INTRAVENOUS at 10:41

## 2021-02-25 RX ADMIN — FAMOTIDINE 20 MG: 10 INJECTION INTRAVENOUS at 10:41

## 2021-02-25 RX ADMIN — ROCURONIUM BROMIDE 40 MG: 50 INJECTION INTRAVENOUS at 11:41

## 2021-02-25 RX ADMIN — MIDAZOLAM 1 MG: 1 INJECTION INTRAMUSCULAR; INTRAVENOUS at 10:42

## 2021-02-25 RX ADMIN — SODIUM CHLORIDE: 9 INJECTION, SOLUTION INTRAVENOUS at 10:33

## 2021-02-25 RX ADMIN — CEFAZOLIN SODIUM 2 G: 2 INJECTION, SOLUTION INTRAVENOUS at 11:33

## 2021-02-25 RX ADMIN — NEOSTIGMINE METHYLSULFATE 3 MG: 1 INJECTION INTRAMUSCULAR; INTRAVENOUS; SUBCUTANEOUS at 12:19

## 2021-02-25 RX ADMIN — HYDROCODONE BITARTRATE AND ACETAMINOPHEN 1 TABLET: 7.5; 325 TABLET ORAL at 13:19

## 2021-02-25 RX ADMIN — LIDOCAINE HYDROCHLORIDE 80 MG: 20 INJECTION, SOLUTION INFILTRATION; PERINEURAL at 11:41

## 2021-02-25 NOTE — ANESTHESIA PREPROCEDURE EVALUATION
Anesthesia Evaluation     Patient summary reviewed                Airway   Mallampati: II  Neck ROM: full  No difficulty expected  Dental      Pulmonary    Cardiovascular     Rhythm: regular    (+) hypertension,       Neuro/Psych  GI/Hepatic/Renal/Endo    (+)   renal disease ESRD,     Musculoskeletal     Abdominal    Substance History      OB/GYN          Other                      Anesthesia Plan    ASA 3     general       Anesthetic plan, all risks, benefits, and alternatives have been provided, discussed and informed consent has been obtained with: patient.  Use of blood products discussed with patient .

## 2021-02-25 NOTE — ANESTHESIA POSTPROCEDURE EVALUATION
Patient: Alonso Torres Sr.    Procedure Summary     Date: 02/25/21 Room / Location: [facility] OR 09 / [facility] MAIN OR    Anesthesia Start: 1131 Anesthesia Stop: 1236    Procedure: INSERTION PERITONEAL DIALYSIS CATHETER LAPAROSCOPIC (N/A Abdomen) Diagnosis:       Screening for colon cancer      (Screening for colon cancer [Z12.11])    Surgeon: Morgan Coyne MD Provider: Yael Chavez MD    Anesthesia Type: general ASA Status: 3          Anesthesia Type: general    Vitals  Vitals Value Taken Time   /85 02/25/21 1310   Temp 36.3 °C (97.3 °F) 02/25/21 1234   Pulse 70 02/25/21 1316   Resp 14 02/25/21 1240   SpO2 96 % 02/25/21 1316   Vitals shown include unvalidated device data.        Post Anesthesia Care and Evaluation    Patient location during evaluation: PACU  Patient participation: complete - patient participated  Level of consciousness: awake and alert  Pain management: adequate  Airway patency: patent  Anesthetic complications: No anesthetic complications    Cardiovascular status: acceptable  Respiratory status: acceptable  Hydration status: acceptable    Comments: --------------------            02/25/21               1240     --------------------   BP:       143/91     Pulse:      73       Resp:       14       Temp:                SpO2:      100%     --------------------

## 2021-02-25 NOTE — ANESTHESIA PROCEDURE NOTES
Airway  Urgency: elective    Date/Time: 2/25/2021 11:44 AM  Airway not difficult    General Information and Staff    Patient location during procedure: OR  Anesthesiologist: Yael Chavez MD  CRNA: Kit Briggs CRNA    Indications and Patient Condition  Indications for airway management: airway protection    Preoxygenated: yes  MILS not maintained throughout  Mask difficulty assessment: 1 - vent by mask    Final Airway Details  Final airway type: endotracheal airway      Successful airway: ETT  Cuffed: yes   Successful intubation technique: direct laryngoscopy  Facilitating devices/methods: anterior pressure/BURP and Bougie  Endotracheal tube insertion site: oral  Blade: Kim  Blade size: 3  ETT size (mm): 7.5  Cormack-Lehane Classification: grade IIb - view of arytenoids or posterior of glottis only  Placement verified by: chest auscultation   Cuff volume (mL): 8  Measured from: lips  ETT/EBT  to lips (cm): 22  Number of attempts at approach: 2  Assessment: lips, teeth, and gum same as pre-op and atraumatic intubation    Additional Comments  Pre O2, SIAI

## 2021-03-22 ENCOUNTER — TELEPHONE (OUTPATIENT)
Dept: SURGERY | Facility: CLINIC | Age: 56
End: 2021-03-22

## 2021-03-22 NOTE — TELEPHONE ENCOUNTER
----- Message from Morgan Coyne MD sent at 3/22/2021  1:56 PM EDT -----  Please call the patient regarding his colonoscopy results.  He will need to follow-up in my office after PD catheter placement.  I do not think he follow-up after surgery.  Please PIC C scope in 5 years

## 2021-03-22 NOTE — TELEPHONE ENCOUNTER
Attempt x 1 to contact patient regarding cscope results and to schedule for a follow up appt. Will place follow-up colonoscopy in recall for scheduling in 5 years as Dr. Coyne has requested.

## 2021-03-24 ENCOUNTER — BULK ORDERING (OUTPATIENT)
Dept: CASE MANAGEMENT | Facility: OTHER | Age: 56
End: 2021-03-24

## 2021-03-24 DIAGNOSIS — Z23 IMMUNIZATION DUE: ICD-10-CM

## 2021-05-23 NOTE — PROGRESS NOTES
"Subjective   Alonso Torres Sr. is a 55 y.o. male.     CC: Reestablishment of Care for HTN    History of Present Illness     Pt returns today after a > 3 year absence to reestablish care and discuss his current state of health. Pt recently was started on dialysis since getting COVID led him to ESRD and pt most recently had a peritoneal dialysis catheter placed (2/25/21) by Dr Coyne. Pt has subsequently No Showed Dr Coyne on two occasions in f/u for management of that. Pt's dialysis is managed by Dr Boyd (nephrology).    The following portions of the patient's history were reviewed and updated as appropriate: allergies, current medications, past family history, past medical history, past social history, past surgical history and problem list.    Review of Systems   Constitutional: Negative for activity change, chills and fever.   Respiratory: Negative for cough.    Cardiovascular: Negative for chest pain.   Psychiatric/Behavioral: Negative for dysphoric mood.       /92   Pulse 83   Temp 97 °F (36.1 °C) (Skin)   Resp 16   Ht 188 cm (74\")   Wt 109 kg (240 lb)   SpO2 98%   BMI 30.81 kg/m²     Objective   Physical Exam  Constitutional:       General: He is not in acute distress.     Appearance: He is well-developed.   Cardiovascular:      Rate and Rhythm: Normal rate and regular rhythm.      Heart sounds: Murmur heard.   Systolic murmur is present with a grade of 2/6.     Pulmonary:      Effort: Pulmonary effort is normal.      Breath sounds: Normal breath sounds.   Neurological:      Mental Status: He is alert and oriented to person, place, and time.   Psychiatric:         Behavior: Behavior normal.         Thought Content: Thought content normal.         Assessment/Plan   Diagnoses and all orders for this visit:    1. Essential hypertension (Primary)  -     Comprehensive metabolic panel  -     Lipid panel  -     CBC and Differential  -     TSH  -     metoprolol succinate XL (TOPROL-XL) 50 MG 24 hr tablet; " Take 1 tablet by mouth Daily.  Dispense: 30 tablet; Refill: 5  -     amLODIPine (NORVASC) 10 MG tablet; Take 1 tablet by mouth Daily.  Dispense: 30 tablet; Refill: 5    2. Idiopathic gout, unspecified chronicity, unspecified site  -     Uric Acid  -     allopurinol (Zyloprim) 100 MG tablet; Take 1 tablet by mouth Daily.  Dispense: 30 tablet; Refill: 5    3. Screening for prostate cancer  -     PSA

## 2021-05-24 ENCOUNTER — OFFICE VISIT (OUTPATIENT)
Dept: FAMILY MEDICINE CLINIC | Facility: CLINIC | Age: 56
End: 2021-05-24

## 2021-05-24 VITALS
WEIGHT: 240 LBS | HEIGHT: 74 IN | BODY MASS INDEX: 30.8 KG/M2 | HEART RATE: 83 BPM | DIASTOLIC BLOOD PRESSURE: 92 MMHG | OXYGEN SATURATION: 98 % | RESPIRATION RATE: 16 BRPM | TEMPERATURE: 97 F | SYSTOLIC BLOOD PRESSURE: 163 MMHG

## 2021-05-24 DIAGNOSIS — I10 ESSENTIAL HYPERTENSION: Primary | ICD-10-CM

## 2021-05-24 DIAGNOSIS — Z12.5 SCREENING FOR PROSTATE CANCER: ICD-10-CM

## 2021-05-24 DIAGNOSIS — M10.00 IDIOPATHIC GOUT, UNSPECIFIED CHRONICITY, UNSPECIFIED SITE: ICD-10-CM

## 2021-05-24 PROCEDURE — 99203 OFFICE O/P NEW LOW 30 MIN: CPT | Performed by: FAMILY MEDICINE

## 2021-05-24 RX ORDER — METOPROLOL SUCCINATE 50 MG/1
50 TABLET, EXTENDED RELEASE ORAL DAILY
Qty: 30 TABLET | Refills: 5 | Status: SHIPPED | OUTPATIENT
Start: 2021-05-24

## 2021-05-24 RX ORDER — GENTAMICIN SULFATE 1 MG/G
CREAM TOPICAL
COMMUNITY
Start: 2021-04-06 | End: 2021-07-08

## 2021-05-24 RX ORDER — AMLODIPINE BESYLATE 10 MG/1
10 TABLET ORAL DAILY
Qty: 30 TABLET | Refills: 5 | Status: SHIPPED | OUTPATIENT
Start: 2021-05-24

## 2021-05-24 RX ORDER — ALLOPURINOL 100 MG/1
100 TABLET ORAL DAILY
Qty: 30 TABLET | Refills: 5 | Status: SHIPPED | OUTPATIENT
Start: 2021-05-24

## 2021-05-24 RX ORDER — CINACALCET 60 MG/1
60 TABLET, FILM COATED ORAL DAILY
COMMUNITY
Start: 2021-04-16 | End: 2021-05-24 | Stop reason: SDUPTHER

## 2021-05-24 RX ORDER — CINACALCET 60 MG/1
60 TABLET, FILM COATED ORAL DAILY
COMMUNITY
Start: 2021-04-13

## 2021-06-18 ENCOUNTER — TELEPHONE (OUTPATIENT)
Dept: SURGERY | Facility: CLINIC | Age: 56
End: 2021-06-18

## 2021-06-18 DIAGNOSIS — Z99.2 PERITONEAL DIALYSIS CATHETER IN PLACE (HCC): Primary | ICD-10-CM

## 2021-06-18 RX ORDER — CEFAZOLIN SODIUM 2 G/100ML
2 INJECTION, SOLUTION INTRAVENOUS ONCE
Status: CANCELLED | OUTPATIENT
Start: 2021-07-12 | End: 2021-06-18

## 2021-06-18 NOTE — TELEPHONE ENCOUNTER
Dr Edge office calling to schedule PD cath removal. Ok to directly schedule or needs office visit? If ok to schedule please place orders.

## 2021-06-21 ENCOUNTER — PRE-ADMISSION TESTING (OUTPATIENT)
Dept: PREADMISSION TESTING | Facility: HOSPITAL | Age: 56
End: 2021-06-21

## 2021-07-01 ENCOUNTER — TRANSCRIBE ORDERS (OUTPATIENT)
Dept: PREADMISSION TESTING | Facility: HOSPITAL | Age: 56
End: 2021-07-01

## 2021-07-01 DIAGNOSIS — Z01.818 OTHER SPECIFIED PRE-OPERATIVE EXAMINATION: Primary | ICD-10-CM

## 2021-07-02 ENCOUNTER — PREP FOR SURGERY (OUTPATIENT)
Dept: OTHER | Facility: HOSPITAL | Age: 56
End: 2021-07-02

## 2021-07-08 ENCOUNTER — PRE-ADMISSION TESTING (OUTPATIENT)
Dept: PREADMISSION TESTING | Facility: HOSPITAL | Age: 56
End: 2021-07-08

## 2021-07-08 VITALS
WEIGHT: 242 LBS | BODY MASS INDEX: 28.57 KG/M2 | OXYGEN SATURATION: 98 % | RESPIRATION RATE: 16 BRPM | TEMPERATURE: 98.3 F | DIASTOLIC BLOOD PRESSURE: 83 MMHG | HEIGHT: 77 IN | SYSTOLIC BLOOD PRESSURE: 138 MMHG | HEART RATE: 81 BPM

## 2021-07-08 LAB
ANION GAP SERPL CALCULATED.3IONS-SCNC: 15.8 MMOL/L (ref 5–15)
BUN SERPL-MCNC: 41 MG/DL (ref 6–20)
BUN/CREAT SERPL: 3.5 (ref 7–25)
CALCIUM SPEC-SCNC: 7.5 MG/DL (ref 8.6–10.5)
CHLORIDE SERPL-SCNC: 101 MMOL/L (ref 98–107)
CO2 SERPL-SCNC: 24.2 MMOL/L (ref 22–29)
CREAT SERPL-MCNC: 11.67 MG/DL (ref 0.76–1.27)
DEPRECATED RDW RBC AUTO: 51.6 FL (ref 37–54)
ERYTHROCYTE [DISTWIDTH] IN BLOOD BY AUTOMATED COUNT: 16.4 % (ref 12.3–15.4)
GFR SERPL CREATININE-BSD FRML MDRD: 5 ML/MIN/1.73
GFR SERPL CREATININE-BSD FRML MDRD: ABNORMAL ML/MIN/{1.73_M2}
GLUCOSE SERPL-MCNC: 122 MG/DL (ref 65–99)
HCT VFR BLD AUTO: 28.9 % (ref 37.5–51)
HGB BLD-MCNC: 9.2 G/DL (ref 13–17.7)
MCH RBC QN AUTO: 28 PG (ref 26.6–33)
MCHC RBC AUTO-ENTMCNC: 31.8 G/DL (ref 31.5–35.7)
MCV RBC AUTO: 87.8 FL (ref 79–97)
PLATELET # BLD AUTO: 146 10*3/MM3 (ref 140–450)
PMV BLD AUTO: 9.7 FL (ref 6–12)
POTASSIUM SERPL-SCNC: 5 MMOL/L (ref 3.5–5.2)
RBC # BLD AUTO: 3.29 10*6/MM3 (ref 4.14–5.8)
SODIUM SERPL-SCNC: 141 MMOL/L (ref 136–145)
WBC # BLD AUTO: 3.31 10*3/MM3 (ref 3.4–10.8)

## 2021-07-08 PROCEDURE — 80048 BASIC METABOLIC PNL TOTAL CA: CPT

## 2021-07-08 PROCEDURE — 36415 COLL VENOUS BLD VENIPUNCTURE: CPT

## 2021-07-08 PROCEDURE — 85027 COMPLETE CBC AUTOMATED: CPT

## 2021-07-08 NOTE — DISCHARGE INSTRUCTIONS

## 2021-07-09 ENCOUNTER — LAB (OUTPATIENT)
Dept: LAB | Facility: HOSPITAL | Age: 56
End: 2021-07-09

## 2021-07-09 DIAGNOSIS — Z01.818 OTHER SPECIFIED PRE-OPERATIVE EXAMINATION: ICD-10-CM

## 2021-07-09 PROCEDURE — U0005 INFEC AGEN DETEC AMPLI PROBE: HCPCS

## 2021-07-09 PROCEDURE — U0004 COV-19 TEST NON-CDC HGH THRU: HCPCS

## 2021-07-09 PROCEDURE — C9803 HOPD COVID-19 SPEC COLLECT: HCPCS

## 2021-07-10 LAB — SARS-COV-2 ORF1AB RESP QL NAA+PROBE: NOT DETECTED

## 2021-07-19 PROBLEM — N18.6 END STAGE RENAL DISEASE (HCC): Status: ACTIVE | Noted: 2020-09-27

## 2021-07-19 PROBLEM — N25.81 SECONDARY HYPERPARATHYROIDISM OF RENAL ORIGIN (HCC): Status: ACTIVE | Noted: 2020-09-27

## 2021-07-19 PROBLEM — I48.0 PAF (PAROXYSMAL ATRIAL FIBRILLATION) (HCC): Status: ACTIVE | Noted: 2017-08-27

## 2021-07-19 PROBLEM — N18.9 ANEMIA IN CHRONIC KIDNEY DISEASE: Status: ACTIVE | Noted: 2020-09-27

## 2021-07-19 PROBLEM — D63.1 ANEMIA IN CHRONIC KIDNEY DISEASE: Status: ACTIVE | Noted: 2020-09-27

## 2021-07-19 PROBLEM — E83.52 HYPERCALCEMIA: Status: ACTIVE | Noted: 2020-09-30

## 2021-08-10 ENCOUNTER — PRE-ADMISSION TESTING (OUTPATIENT)
Dept: PREADMISSION TESTING | Facility: HOSPITAL | Age: 56
End: 2021-08-10

## 2021-08-10 VITALS
TEMPERATURE: 98.8 F | HEIGHT: 74 IN | RESPIRATION RATE: 16 BRPM | HEART RATE: 97 BPM | DIASTOLIC BLOOD PRESSURE: 91 MMHG | BODY MASS INDEX: 30.12 KG/M2 | WEIGHT: 234.7 LBS | OXYGEN SATURATION: 96 % | SYSTOLIC BLOOD PRESSURE: 166 MMHG

## 2021-08-10 LAB
ANION GAP SERPL CALCULATED.3IONS-SCNC: 13.1 MMOL/L (ref 5–15)
BUN SERPL-MCNC: 36 MG/DL (ref 6–20)
BUN/CREAT SERPL: 3.4 (ref 7–25)
CALCIUM SPEC-SCNC: 8.5 MG/DL (ref 8.6–10.5)
CHLORIDE SERPL-SCNC: 102 MMOL/L (ref 98–107)
CO2 SERPL-SCNC: 24.9 MMOL/L (ref 22–29)
CREAT SERPL-MCNC: 10.59 MG/DL (ref 0.76–1.27)
DEPRECATED RDW RBC AUTO: 50.1 FL (ref 37–54)
ERYTHROCYTE [DISTWIDTH] IN BLOOD BY AUTOMATED COUNT: 16.4 % (ref 12.3–15.4)
GFR SERPL CREATININE-BSD FRML MDRD: 6 ML/MIN/1.73
GFR SERPL CREATININE-BSD FRML MDRD: ABNORMAL ML/MIN/{1.73_M2}
GLUCOSE SERPL-MCNC: 85 MG/DL (ref 65–99)
HCT VFR BLD AUTO: 34.1 % (ref 37.5–51)
HGB BLD-MCNC: 11 G/DL (ref 13–17.7)
MCH RBC QN AUTO: 27.8 PG (ref 26.6–33)
MCHC RBC AUTO-ENTMCNC: 32.3 G/DL (ref 31.5–35.7)
MCV RBC AUTO: 86.1 FL (ref 79–97)
PLATELET # BLD AUTO: 152 10*3/MM3 (ref 140–450)
PMV BLD AUTO: 8.8 FL (ref 6–12)
POTASSIUM SERPL-SCNC: 4.5 MMOL/L (ref 3.5–5.2)
QT INTERVAL: 418 MS
RBC # BLD AUTO: 3.96 10*6/MM3 (ref 4.14–5.8)
SARS-COV-2 ORF1AB RESP QL NAA+PROBE: NOT DETECTED
SODIUM SERPL-SCNC: 140 MMOL/L (ref 136–145)
WBC # BLD AUTO: 3.57 10*3/MM3 (ref 3.4–10.8)

## 2021-08-10 PROCEDURE — 85027 COMPLETE CBC AUTOMATED: CPT

## 2021-08-10 PROCEDURE — U0004 COV-19 TEST NON-CDC HGH THRU: HCPCS

## 2021-08-10 PROCEDURE — 93005 ELECTROCARDIOGRAM TRACING: CPT

## 2021-08-10 PROCEDURE — 93010 ELECTROCARDIOGRAM REPORT: CPT | Performed by: INTERNAL MEDICINE

## 2021-08-10 PROCEDURE — 36415 COLL VENOUS BLD VENIPUNCTURE: CPT

## 2021-08-10 PROCEDURE — U0005 INFEC AGEN DETEC AMPLI PROBE: HCPCS

## 2021-08-10 PROCEDURE — 80048 BASIC METABOLIC PNL TOTAL CA: CPT

## 2021-08-10 PROCEDURE — C9803 HOPD COVID-19 SPEC COLLECT: HCPCS

## 2021-08-10 NOTE — DISCHARGE INSTRUCTIONS
Take the following medications the morning of surgery:  AMLODIPINE, METOPROLOL    ARRIVE 11:00 AM  8/12      If you are on prescription narcotic pain medication to control your pain you may also take that medication the morning of surgery.    General Instructions:  • Do not eat solid food after midnight the night before surgery.  • You may drink clear liquids day of surgery but must stop at least one hour before your hospital arrival time.  • It is beneficial for you to have a clear drink that contains carbohydrates the day of surgery.  We suggest a 12 to 20 ounce bottle of Gatorade or Powerade for non-diabetic patients or a 12 to 20 ounce bottle of G2 or Powerade Zero for diabetic patients. (Pediatric patients, are not advised to drink a 12 to 20 ounce carbohydrate drink)    Clear liquids are liquids you can see through.  Nothing red in color.     Plain water                               Sports drinks  Sodas                                   Gelatin (Jell-O)  Fruit juices without pulp such as white grape juice and apple juice  Popsicles that contain no fruit or yogurt  Tea or coffee (no cream or milk added)  Gatorade / Powerade  G2 / Powerade Zero    • Infants may have breast milk up to four hours before surgery.  • Infants drinking formula may drink formula up to six hours before surgery.   • Patients who avoid smoking, chewing tobacco and alcohol for 4 weeks prior to surgery have a reduced risk of post-operative complications.  Quit smoking as many days before surgery as you can.  • Do not smoke, use chewing tobacco or drink alcohol the day of surgery.   • If applicable bring your C-PAP/ BI-PAP machine.  • Bring any papers given to you in the doctor’s office.  • Wear clean comfortable clothes.  • Do not wear contact lenses, false eyelashes or make-up.  Bring a case for your glasses.   • Bring crutches or walker if applicable.  • Remove all piercings.  Leave jewelry and any other valuables at home.  • Hair  extensions with metal clips must be removed prior to surgery.  • The Pre-Admission Testing nurse will instruct you to bring medications if unable to obtain an accurate list in Pre-Admission Testing.        Preventing a Surgical Site Infection:  • For 2 to 3 days before surgery, avoid shaving with a razor because the razor can irritate skin and make it easier to develop an infection.    • Any areas of open skin can increase the risk of a post-operative wound infection by allowing bacteria to enter and travel throughout the body.  Notify your surgeon if you have any skin wounds / rashes even if it is not near the expected surgical site.  The area will need assessed to determine if surgery should be delayed until it is healed.  • The night prior to surgery shower using a fresh bar of anti-bacterial soap (such as Dial) and clean washcloth.  Sleep in a clean bed with clean clothing.  Do not allow pets to sleep with you.  • Shower on the morning of surgery using a fresh bar of anti-bacterial soap (such as Dial) and clean washcloth.  Dry with a clean towel and dress in clean clothing.  • Ask your surgeon if you will be receiving antibiotics prior to surgery.  • Make sure you, your family, and all healthcare providers clean their hands with soap and water or an alcohol based hand  before caring for you or your wound.    Day of surgery:  Your arrival time is approximately two hours before your scheduled surgery time.  Upon arrival, a Pre-op nurse and Anesthesiologist will review your health history, obtain vital signs, and answer questions you may have.  The only belongings needed at this time will be a list of your home medications and if applicable your C-PAP/BI-PAP machine.  A Pre-op nurse will start an IV and you may receive medication in preparation for surgery, including something to help you relax.     Please be aware that surgery does come with discomfort.  We want to make every effort to control your  discomfort so please discuss any uncontrolled symptoms with your nurse.   Your doctor will most likely have prescribed pain medications.      If you are going home after surgery you will receive individualized written care instructions before being discharged.  A responsible adult must drive you to and from the hospital on the day of your surgery and stay with you for 24 hours.  Discharge prescriptions can be filled by the hospital pharmacy during regular pharmacy hours.  If you are having surgery late in the day/evening your prescription may be e-prescribed to your pharmacy.  Please verify your pharmacy hours or chose a 24 hour pharmacy to avoid not having access to your prescription because your pharmacy has closed for the day.    If you are staying overnight following surgery, you will be transported to your hospital room following the recovery period.   has all private rooms.    If you have any questions please call Pre-Admission Testing at [phone].  Deductibles and co-payments are collected on the day of service. Please be prepared to pay the required co-pay, deductible or deposit on the day of service as defined by your plan.    Patient Education for Self-Quarantine Process    • Following your COVID testing, we strongly recommend that you wear a mask when you are with other people and practice social distancing.   • Limit your activities to only required outings.  • Wash your hands with soap and water frequently for at least 20 seconds.   • Avoid touching your eyes, nose and mouth with unwashed hands.  • Do not share anything - utensils, drinking glasses, food from the same bowl.   • Sanitize household surfaces daily. Include all high touch areas (door handles, light switches, phones, countertops, etc.)    Call your surgeon immediately if you experience any of the following symptoms:  • Sore Throat  • Shortness of Breath or difficulty breathing  • Cough  • Chills  • Body soreness  or muscle pain  • Headache  • Fever  • New loss of taste or smell  • Do not arrive for your surgery ill.  Your procedure will need to be rescheduled to another time.  You will need to call your physician before the day of surgery to avoid any unnecessary exposure to hospital staff as well as other patients.    CHLORHEXIDINE CLOTH INSTRUCTIONS  The morning of surgery follow these instructions using the Chlorhexidine cloths you've been given.  These steps reduce bacteria on the body.  Do not use the cloths near your eyes, ears mouth, genitalia or on open wounds.  Throw the cloths away after use but do not try to flush them down a toilet.      • Open and remove one cloth at a time from the package.    • Leave the cloth unfolded and begin the bathing.  • Massage the skin with the cloths using gentle pressure to remove bacteria.  Do not scrub harshly.   • Follow the steps below with one 2% CHG cloth per area (6 total cloths).  • One cloth for neck, shoulders and chest.  • One cloth for both arms, hands, fingers and underarms (do underarms last).  • One cloth for the abdomen followed by groin.  • One cloth for right leg and foot including between the toes.  • One cloth for left leg and foot including between the toes.  • The last cloth is to be used for the back of the neck, back and buttocks.    Allow the CHG to air dry 3 minutes on the skin which will give it time to work and decrease the chance of irritation.  The skin may feel sticky until it is dry.  Do not rinse with water or any other liquid or you will lose the beneficial effects of the CHG.  If mild skin irritation occurs, do rinse the skin to remove the CHG.  Report this to the nurse at time of admission.  Do not apply lotions, creams, ointments, deodorants or perfumes after using the clothes. Dress in clean clothes before coming to the hospital.

## 2021-08-12 ENCOUNTER — HOSPITAL ENCOUNTER (OUTPATIENT)
Facility: HOSPITAL | Age: 56
Setting detail: HOSPITAL OUTPATIENT SURGERY
Discharge: HOME | End: 2021-08-12
Attending: SURGERY | Admitting: SURGERY

## 2021-08-12 ENCOUNTER — ANESTHESIA (OUTPATIENT)
Dept: PERIOP | Facility: HOSPITAL | Age: 56
End: 2021-08-12

## 2021-08-12 ENCOUNTER — ANESTHESIA EVENT (OUTPATIENT)
Dept: PERIOP | Facility: HOSPITAL | Age: 56
End: 2021-08-12

## 2021-08-12 VITALS
TEMPERATURE: 97.8 F | OXYGEN SATURATION: 95 % | RESPIRATION RATE: 16 BRPM | SYSTOLIC BLOOD PRESSURE: 146 MMHG | HEART RATE: 76 BPM | DIASTOLIC BLOOD PRESSURE: 93 MMHG

## 2021-08-12 DIAGNOSIS — Z99.2 PERITONEAL DIALYSIS CATHETER IN PLACE (HCC): ICD-10-CM

## 2021-08-12 PROCEDURE — 25010000002 MIDAZOLAM PER 1 MG: Performed by: ANESTHESIOLOGY

## 2021-08-12 PROCEDURE — S0260 H&P FOR SURGERY: HCPCS | Performed by: SURGERY

## 2021-08-12 PROCEDURE — 25010000002 FENTANYL CITRATE (PF) 50 MCG/ML SOLUTION: Performed by: ANESTHESIOLOGY

## 2021-08-12 PROCEDURE — 25010000002 PROPOFOL 10 MG/ML EMULSION: Performed by: NURSE ANESTHETIST, CERTIFIED REGISTERED

## 2021-08-12 PROCEDURE — 49422 REMOVE TUNNELED IP CATH: CPT | Performed by: SURGERY

## 2021-08-12 RX ORDER — SODIUM CHLORIDE 0.9 % (FLUSH) 0.9 %
3-10 SYRINGE (ML) INJECTION AS NEEDED
Status: DISCONTINUED | OUTPATIENT
Start: 2021-08-12 | End: 2021-08-12 | Stop reason: HOSPADM

## 2021-08-12 RX ORDER — HYDROMORPHONE HYDROCHLORIDE 1 MG/ML
0.5 INJECTION, SOLUTION INTRAMUSCULAR; INTRAVENOUS; SUBCUTANEOUS
Status: DISCONTINUED | OUTPATIENT
Start: 2021-08-12 | End: 2021-08-12 | Stop reason: HOSPADM

## 2021-08-12 RX ORDER — GLYCOPYRROLATE 0.2 MG/ML
0.2 INJECTION INTRAMUSCULAR; INTRAVENOUS
Status: COMPLETED | OUTPATIENT
Start: 2021-08-12 | End: 2021-08-12

## 2021-08-12 RX ORDER — BUPIVACAINE HYDROCHLORIDE AND EPINEPHRINE 5; 5 MG/ML; UG/ML
INJECTION, SOLUTION EPIDURAL; INTRACAUDAL; PERINEURAL AS NEEDED
Status: DISCONTINUED | OUTPATIENT
Start: 2021-08-12 | End: 2021-08-12 | Stop reason: HOSPADM

## 2021-08-12 RX ORDER — SODIUM CHLORIDE 0.9 % (FLUSH) 0.9 %
3 SYRINGE (ML) INJECTION EVERY 12 HOURS SCHEDULED
Status: DISCONTINUED | OUTPATIENT
Start: 2021-08-12 | End: 2021-08-12 | Stop reason: HOSPADM

## 2021-08-12 RX ORDER — LIDOCAINE HYDROCHLORIDE 20 MG/ML
INJECTION, SOLUTION INFILTRATION; PERINEURAL AS NEEDED
Status: DISCONTINUED | OUTPATIENT
Start: 2021-08-12 | End: 2021-08-12 | Stop reason: SURG

## 2021-08-12 RX ORDER — OXYCODONE HYDROCHLORIDE AND ACETAMINOPHEN 5; 325 MG/1; MG/1
1 TABLET ORAL ONCE AS NEEDED
Status: DISCONTINUED | OUTPATIENT
Start: 2021-08-12 | End: 2021-08-12 | Stop reason: HOSPADM

## 2021-08-12 RX ORDER — SODIUM CHLORIDE, SODIUM LACTATE, POTASSIUM CHLORIDE, CALCIUM CHLORIDE 600; 310; 30; 20 MG/100ML; MG/100ML; MG/100ML; MG/100ML
9 INJECTION, SOLUTION INTRAVENOUS CONTINUOUS
Status: DISCONTINUED | OUTPATIENT
Start: 2021-08-12 | End: 2021-08-12 | Stop reason: HOSPADM

## 2021-08-12 RX ORDER — PROPOFOL 10 MG/ML
VIAL (ML) INTRAVENOUS CONTINUOUS PRN
Status: DISCONTINUED | OUTPATIENT
Start: 2021-08-12 | End: 2021-08-12 | Stop reason: SURG

## 2021-08-12 RX ORDER — LIDOCAINE HYDROCHLORIDE 10 MG/ML
0.5 INJECTION, SOLUTION EPIDURAL; INFILTRATION; INTRACAUDAL; PERINEURAL ONCE AS NEEDED
Status: DISCONTINUED | OUTPATIENT
Start: 2021-08-12 | End: 2021-08-12 | Stop reason: HOSPADM

## 2021-08-12 RX ORDER — ONDANSETRON 2 MG/ML
4 INJECTION INTRAMUSCULAR; INTRAVENOUS ONCE AS NEEDED
Status: DISCONTINUED | OUTPATIENT
Start: 2021-08-12 | End: 2021-08-12 | Stop reason: HOSPADM

## 2021-08-12 RX ORDER — FENTANYL CITRATE 50 UG/ML
50 INJECTION, SOLUTION INTRAMUSCULAR; INTRAVENOUS
Status: DISCONTINUED | OUTPATIENT
Start: 2021-08-12 | End: 2021-08-12 | Stop reason: HOSPADM

## 2021-08-12 RX ORDER — FLUMAZENIL 0.1 MG/ML
0.2 INJECTION INTRAVENOUS AS NEEDED
Status: DISCONTINUED | OUTPATIENT
Start: 2021-08-12 | End: 2021-08-12 | Stop reason: HOSPADM

## 2021-08-12 RX ORDER — HYDROCODONE BITARTRATE AND ACETAMINOPHEN 5; 325 MG/1; MG/1
1 TABLET ORAL ONCE AS NEEDED
Status: DISCONTINUED | OUTPATIENT
Start: 2021-08-12 | End: 2021-08-12 | Stop reason: HOSPADM

## 2021-08-12 RX ORDER — SODIUM CHLORIDE 9 MG/ML
INJECTION, SOLUTION INTRAVENOUS CONTINUOUS PRN
Status: DISCONTINUED | OUTPATIENT
Start: 2021-08-12 | End: 2021-08-12 | Stop reason: SURG

## 2021-08-12 RX ORDER — EPHEDRINE SULFATE 50 MG/ML
5 INJECTION, SOLUTION INTRAVENOUS ONCE AS NEEDED
Status: DISCONTINUED | OUTPATIENT
Start: 2021-08-12 | End: 2021-08-12 | Stop reason: HOSPADM

## 2021-08-12 RX ORDER — MIDAZOLAM HYDROCHLORIDE 1 MG/ML
1 INJECTION INTRAMUSCULAR; INTRAVENOUS
Status: COMPLETED | OUTPATIENT
Start: 2021-08-12 | End: 2021-08-12

## 2021-08-12 RX ORDER — FENTANYL CITRATE 50 UG/ML
50 INJECTION, SOLUTION INTRAMUSCULAR; INTRAVENOUS ONCE
Status: COMPLETED | OUTPATIENT
Start: 2021-08-12 | End: 2021-08-12

## 2021-08-12 RX ORDER — CEFAZOLIN SODIUM 2 G/100ML
2 INJECTION, SOLUTION INTRAVENOUS ONCE
Status: DISCONTINUED | OUTPATIENT
Start: 2021-08-12 | End: 2021-08-12 | Stop reason: HOSPADM

## 2021-08-12 RX ORDER — FENTANYL CITRATE 50 UG/ML
100 INJECTION, SOLUTION INTRAMUSCULAR; INTRAVENOUS
Status: DISCONTINUED | OUTPATIENT
Start: 2021-08-12 | End: 2021-08-12 | Stop reason: HOSPADM

## 2021-08-12 RX ORDER — MIDAZOLAM HYDROCHLORIDE 1 MG/ML
2 INJECTION INTRAMUSCULAR; INTRAVENOUS ONCE
Status: DISCONTINUED | OUTPATIENT
Start: 2021-08-12 | End: 2021-08-12 | Stop reason: HOSPADM

## 2021-08-12 RX ORDER — HYDROCODONE BITARTRATE AND ACETAMINOPHEN 7.5; 325 MG/1; MG/1
1 TABLET ORAL EVERY 4 HOURS PRN
Status: DISCONTINUED | OUTPATIENT
Start: 2021-08-12 | End: 2021-08-12 | Stop reason: HOSPADM

## 2021-08-12 RX ADMIN — PROPOFOL 120 MCG/KG/MIN: 10 INJECTION, EMULSION INTRAVENOUS at 12:57

## 2021-08-12 RX ADMIN — SODIUM CHLORIDE: 9 INJECTION, SOLUTION INTRAVENOUS at 12:40

## 2021-08-12 RX ADMIN — MIDAZOLAM 2 MG: 1 INJECTION INTRAMUSCULAR; INTRAVENOUS at 12:54

## 2021-08-12 RX ADMIN — FENTANYL CITRATE 50 MCG: 50 INJECTION INTRAMUSCULAR; INTRAVENOUS at 12:54

## 2021-08-12 RX ADMIN — FENTANYL CITRATE 50 MCG: 50 INJECTION INTRAMUSCULAR; INTRAVENOUS at 13:04

## 2021-08-12 RX ADMIN — SODIUM CHLORIDE, POTASSIUM CHLORIDE, SODIUM LACTATE AND CALCIUM CHLORIDE: 600; 310; 30; 20 INJECTION, SOLUTION INTRAVENOUS at 13:15

## 2021-08-12 RX ADMIN — GLYCOPYRROLATE 0.2 MG: 0.2 INJECTION INTRAMUSCULAR; INTRAVENOUS at 11:38

## 2021-08-12 RX ADMIN — LIDOCAINE HYDROCHLORIDE 40 MG: 20 INJECTION, SOLUTION INFILTRATION; PERINEURAL at 12:57

## 2021-08-12 RX ADMIN — MIDAZOLAM 1 MG: 1 INJECTION INTRAMUSCULAR; INTRAVENOUS at 11:38

## 2021-08-12 NOTE — OP NOTE
PREOPERATIVE DIAGNOSIS:  Peritoneal dialysis in situ  POSTOPERATIVE DIAGNOSIS:  Same  PROCEDURE:  Open removal of peritoneal dialysis catheter  SURGEON/STAFF:  SADE Coyne  ANESTHESIA:  MAC.   BLOOD LOSS: Minimal  COUNTS:  Needle and sponge count correct.  SPECIMENS:  PD catheter, not sent for pathology    INDICATIONS FOR OPERATION: 56 y.o. year old male who presented for PD catheter removal. He couldn't handle PD.  I offered open removal.   The risks and benefits of open, conservative and laparoscopic management were discussed at length and in detail with the patient.  she has chosen to undergo open removal.     OPERATION:  The patient was brought to the operating room in stable condition.   Preoperative antibiotics were given and sequential compression devices were applied.  At this time, the patient was laid supine on the operating room table. Moderate sedation was induced by the Anesthesia service without difficulty.  The patient's abdomen was prepped and draped in the usual sterile fashion.      At this time, half percent Marcaine with epinephrine was injected left lateral to the umbilical area. A 2 cm incision was performed with scalpel. Dissection was carried down to the muscular fascia. The PD catheter was found and dissected circumferentially. The distal cuff was found and dissected. The distal aspect was removed and the proximal cuff was dissected free of adhesions.   The catheter was removed and was complete. The rectus muscle fascia was closed with interrupted single 0- Vicryl sutures. The wound was then closed in 2 layers with 3-0 Vicryl and 4-0 Monocryl. The wound was covered with steri-strips. The catheter exit site was covered with 4x4 gauze and Tegaderm.      The patient tolerated the procedure well.  Instrument and sponge count were correct.    The patient was sent to recovery room in stable condition.  I, the attending surgeon, performed the entire operation.    Morgan Coyne MD  General,  Minimally Invasive and Endoscopic Surgery  [facility]

## 2021-08-12 NOTE — ANESTHESIA POSTPROCEDURE EVALUATION
Patient: Alonso Torres Sr.    Procedure Summary     Date: 08/12/21 Room / Location:  [facility]  [facility]    Anesthesia Start: 1245 Anesthesia Stop: 1324    Procedure: REMOVAL PERITONEAL DIALYSIS CATHETER (N/A Abdomen) Diagnosis:       Peritoneal dialysis catheter in place (CMS/HCC)      (Peritoneal dialysis catheter in place (CMS/HCC) [Z99.2])    Surgeons: Morgan Coyne MD Provider: Cain Colmenares MD    Anesthesia Type: MAC ASA Status: 4          Anesthesia Type: MAC    Vitals  Vitals Value Taken Time   /90 08/12/21 1346   Temp 36.6 °C (97.8 °F) 08/12/21 1326   Pulse 76 08/12/21 1358   Resp 18 08/12/21 1345   SpO2 96 % 08/12/21 1358   Vitals shown include unvalidated device data.        Post Anesthesia Care and Evaluation    Patient location during evaluation: bedside  Patient participation: complete - patient participated  Level of consciousness: awake  Pain management: adequate  Airway patency: patent  Anesthetic complications: No anesthetic complications  PONV Status: controlled  Cardiovascular status: acceptable  Respiratory status: acceptable  Hydration status: acceptable    Comments: --------------------            08/12/21               1404     --------------------   BP:       146/93     Pulse:      76       Resp:       16       Temp:                SpO2:      95%      --------------------       Please let Anamaria know, a prescription was sent for nystatin take 5 ml by mouth 4 times daily for 10 days.  Place one-half of the dose in each side of mouth and retain in mouth as long as possible before swallowing. Continue treatment for at least 48 hours after perioral symptoms disappear. If symptoms persist and/or worsen to follow-up in office. Thank you.

## 2021-08-12 NOTE — H&P
No chief complaint on file.      Subjective     Alonso Torres  is a 56 y.o. male here for peritoneal dialysis catheter removal.  He could not deal with the catheter itself.  It was too much work and he felt bloated.  He decided to switch to hemodialysis.  Catheter was working fine.  No signs of peritonitis    Past Medical History:   Diagnosis Date   • Dialysis patient (CMS/[facility])     MON,WED,FRI   • End stage kidney disease (CMS/[facility])    • Fistula     RIGHT ARM   • Gout     HISTORY   • History of 2019 novel coronavirus disease (COVID-19) 07/2020   • History of atrial fibrillation    • History of transfusion     1 UNIT, NO REACTIONS   • Hypertension    • Joint pain    • Peritoneal dialysis catheter in place (CMS/[facility])    • Psoriasis    • Vitamin D deficiency        Past Surgical History:   Procedure Laterality Date   • CARDIAC ABLATION  2017   • COLONOSCOPY  2008   • COLONOSCOPY N/A 2/18/2021    Procedure: COLONOSCOPY to cecum with Injection of NS 3cc, Polypectomy;  Surgeon: Morgan Coyne MD;  Location: [facility] ENDOSCOPY;  Service: General;  Laterality: N/A;  Screening  Polyp, Diverticulosis, Hemorrhoids   • DIALYSIS FISTULA CREATION Right 02/2021   • INGUINAL HERNIA REPAIR Bilateral     age 15   • INSERT / REPLACE / VENOUS ACCESS CATHETER Right     DIALYSIS CATHETER PLACED RT    • INSERTION PERITONEAL DIALYSIS CATHETER N/A 2/25/2021    Procedure: INSERTION PERITONEAL DIALYSIS CATHETER LAPAROSCOPIC;  Surgeon: Morgan Coyne MD;  Location: [facility] MAIN OR;  Service: General;  Laterality: N/A;         Current Facility-Administered Medications:   •  ceFAZolin in dextrose (ANCEF) IVPB solution 2 g, 2 g, Intravenous, Once, Morgan Coyne MD  •  fentaNYL citrate (PF) (SUBLIMAZE) injection 100 mcg, 100 mcg, Intravenous, Q15 Min PRN, Cain Colmenares MD  •  fentaNYL citrate (PF) (SUBLIMAZE) injection 50 mcg, 50 mcg, Intravenous, Once, Cain Colmenares MD  •  lactated ringers infusion, 9 mL/hr,  Intravenous, Continuous, Cain Colmenares MD  •  lidocaine PF 1% (XYLOCAINE) injection 0.5 mL, 0.5 mL, Injection, Once PRN, Cain Colmenares MD  •  midazolam (VERSED) injection 1 mg, 1 mg, Intravenous, Q10 Min PRN, Cain Colmenares MD, 1 mg at 08/12/21 1138  •  midazolam (VERSED) injection 2 mg, 2 mg, Intravenous, Once, Cain Colmenares MD  •  sodium chloride 0.9 % flush 3 mL, 3 mL, Intravenous, Q12H, Cain Colmenares MD  •  sodium chloride 0.9 % flush 3-10 mL, 3-10 mL, Intravenous, PRN, Cain Colmenares MD    Allergies   Allergen Reactions   • Nsaids Other (See Comments)     DUE TO CHRONIC KIDNEY DISEASE   • Ace Inhibitors Swelling     LISINOPRIL       Family History   Problem Relation Age of Onset   • Depression Mother    • Mental illness Mother    • Colon cancer Father    • Kidney disease Other    • Aortic aneurysm Neg Hx    • Malig Hyperthermia Neg Hx        Social History     Socioeconomic History   • Marital status:      Spouse name: Not on file   • Number of children: Not on file   • Years of education: Not on file   • Highest education level: Not on file   Tobacco Use   • Smoking status: Never Smoker   • Smokeless tobacco: Never Used   Vaping Use   • Vaping Use: Never used   Substance and Sexual Activity   • Alcohol use: Never   • Drug use: Never   • Sexual activity: Defer       REVIEW OF SYSTEMS    CONSTITUTIONAL: Denies fevers, chills, unintentional weight loss or weight gain  RESPIRATORY: Denies chronic cough or sob.   CARDIAC: Denies chest pain, palpitations, edema  GI: Denies dyspepsia, reflux, heartburn, nausea, vomiting, diarrhea or constipation : Denies dysuria or hematuria.    MUSCULOSKELETAL: Denies muscle weakness and pain   NEURO: Denies chronic headaches.   ENDOCRINE: Denies significant heat or cold intolerance or history of thyroid problems.    DERM: no rashes,lesions or discharge.     Physical Examination  BP (!) 168/105   Pulse 72   Temp 98.5 °F (36.9 °C) (Oral)   Resp 18   SpO2  99% Comment: post versed  There is no height or weight on file to calculate BMI.  GENERAL:alert, well appearing, and in no distress and oriented to person, place, and time  HEENT: normochephalic, atraumatic, no scleral icterus moist mucous membranes.  NECK: Supple there is no thyromegaly or lymphadenopathy  CHEST: clear to auscultation, no wheezes, rales or rhonchi, symmetric air entry  CARDIAC: regular rate and rhythm    ABDOMEN: soft, nontender, nondistended, no masses or organomegaly.  Peritoneal dialysis catheter in place in the left mid abdomen.  No signs of infection  EXTREMITIES: no cyanosis, clubbing or edema    NEURO: alert and oriented, normal speech, cranial nerves 2-12 grossly intact, no focal deficits   SKIN: Moist, warm, no rashes.    Assessment/Plan   56-year-old male with end-stage renal disease on hemodialysis failed peritoneal dialysis.  Discussed with him about further step.  Recommend that he undergoes peritoneal dialysis catheter removal under moderate sedation.  Risk and benefits of procedure including bleeding, infection, intra-abdominal injuries were discussed in detail with the patient that verbalized understanding and agreed with the plan       Diagnosis Plan   1. Peritoneal dialysis catheter in place (CMS/[facility])  ceFAZolin in dextrose (ANCEF) IVPB solution 2 g    Verify / Perform Chlorhexidine Skin Prep    Verify / Perform Chlorhexidine Skin Prep if Indicated (If Not Already Completed)    Verify / Perform Chlorhexidine Skin Prep    Verify / Perform Chlorhexidine Skin Prep if Indicated (If Not Already Completed)            Morgan Coyne MD  General, Minimally Invasive and Endoscopic Surgery  [facility]    [address] [place] [address], [address]  [place], [place], [facility]  P: [phone]  F: [phone]

## 2021-08-12 NOTE — BRIEF OP NOTE
REMOVAL PERITONEAL DIALYSIS CATHETER  Progress Note    Alonso Torres Sr.  8/12/2021    Pre-op Diagnosis:   Peritoneal dialysis catheter in place (CMS/[facility]) [Z99.2]       Post-Op Diagnosis Codes:     * Peritoneal dialysis catheter in place (CMS/HCC) [Z99.2]    Procedure/CPT® Codes:        Procedure(s):  REMOVAL PERITONEAL DIALYSIS CATHETER    Surgeon(s):  oMrgan Coyne MD    Anesthesia: General    Staff:   Circulator: Beckie Sosa RN; Nicki Draper RN  Scrub Person: Dilma Abarca RN; Eve Jackson         Estimated Blood Loss: minimal    Urine Voided: * No values recorded between 8/12/2021 12:45 PM and 8/12/2021  1:20 PM *    Specimens:                None          Drains:   Peritoneal Dialysis Catheter (Active)       Findings: well incorporated PD catheter    Complications: None          Morgan Coyne MD     Date: 8/12/2021  Time: 13:20 EDT

## 2021-08-12 NOTE — ANESTHESIA PREPROCEDURE EVALUATION
Anesthesia Evaluation     Patient summary reviewed   NPO Solid Status: > 8 hours             Airway   Mallampati: II  Neck ROM: full  No difficulty expected  Dental      Pulmonary    Cardiovascular     Rhythm: regular    (+) hypertension, dysrhythmias Atrial Fib,       Neuro/Psych  GI/Hepatic/Renal/Endo    (+)   renal disease ESRD and dialysis,     Musculoskeletal     Abdominal    Substance History      OB/GYN          Other        ROS/Med Hx Other: Dialysis yesterday                    Anesthesia Plan    ASA 4     general       Anesthetic plan, all risks, benefits, and alternatives have been provided, discussed and informed consent has been obtained with: patient.  Use of blood products discussed with patient .

## 2024-08-20 ENCOUNTER — TELEPHONE (OUTPATIENT)
Dept: FAMILY MEDICINE CLINIC | Facility: CLINIC | Age: 59
End: 2024-08-20
Payer: MEDICARE

## 2024-08-20 NOTE — TELEPHONE ENCOUNTER
Ok for HUB to read and schedule.   Pt needs to schedule an appt to establish with Dr. Villanueva.

## 2024-11-26 ENCOUNTER — TRANSCRIBE ORDERS (OUTPATIENT)
Dept: ADMINISTRATIVE | Facility: HOSPITAL | Age: 59
End: 2024-11-26
Payer: MEDICARE

## 2024-11-26 DIAGNOSIS — R10.9 FLANK PAIN: Primary | ICD-10-CM

## 2025-03-17 ENCOUNTER — TRANSCRIBE ORDERS (OUTPATIENT)
Dept: ADMINISTRATIVE | Facility: HOSPITAL | Age: 60
End: 2025-03-17
Payer: MEDICARE

## 2025-03-17 DIAGNOSIS — I50.9 STAGE B ACC/AHA ASYMPTOMATIC HEART FAILURE: ICD-10-CM

## 2025-03-17 DIAGNOSIS — R09.89 BRUIT: Primary | ICD-10-CM

## 2025-07-04 ENCOUNTER — HOSPITAL ENCOUNTER (OUTPATIENT)
Facility: HOSPITAL | Age: 60
Setting detail: OBSERVATION
Discharge: HOME | End: 2025-07-05
Attending: EMERGENCY MEDICINE | Admitting: HOSPITALIST
Payer: MEDICARE

## 2025-07-04 ENCOUNTER — APPOINTMENT (OUTPATIENT)
Dept: GENERAL RADIOLOGY | Facility: HOSPITAL | Age: 60
End: 2025-07-04
Payer: MEDICARE

## 2025-07-04 DIAGNOSIS — R07.2 PRECORDIAL PAIN: Primary | ICD-10-CM

## 2025-07-04 PROBLEM — R94.39 ABNORMAL CARDIOVASCULAR STRESS TEST: Status: ACTIVE | Noted: 2025-07-04

## 2025-07-04 PROBLEM — I50.20 HEART FAILURE WITH REDUCED EJECTION FRACTION: Status: ACTIVE | Noted: 2025-07-04

## 2025-07-04 PROBLEM — R07.9 CHEST PAIN: Status: ACTIVE | Noted: 2025-07-04

## 2025-07-04 PROBLEM — I35.0 AORTIC STENOSIS: Status: ACTIVE | Noted: 2025-07-04

## 2025-07-04 PROBLEM — I21.4 NSTEMI (NON-ST ELEVATED MYOCARDIAL INFARCTION): Status: ACTIVE | Noted: 2025-07-04

## 2025-07-04 LAB
ALBUMIN SERPL-MCNC: 4 G/DL (ref 3.5–5.2)
ALBUMIN SERPL-MCNC: 4.6 G/DL (ref 3.5–5.2)
ALBUMIN/GLOB SERPL: 1.5 G/DL
ALBUMIN/GLOB SERPL: 1.6 G/DL
ALP SERPL-CCNC: 65 U/L (ref 39–117)
ALP SERPL-CCNC: 82 U/L (ref 39–117)
ALT SERPL W P-5'-P-CCNC: 13 U/L (ref 1–41)
ALT SERPL W P-5'-P-CCNC: 15 U/L (ref 1–41)
ANION GAP SERPL CALCULATED.3IONS-SCNC: 12.3 MMOL/L (ref 5–15)
ANION GAP SERPL CALCULATED.3IONS-SCNC: 13 MMOL/L (ref 5–15)
AST SERPL-CCNC: 11 U/L (ref 1–40)
AST SERPL-CCNC: 12 U/L (ref 1–40)
BASOPHILS # BLD AUTO: 0.02 10*3/MM3 (ref 0–0.2)
BASOPHILS NFR BLD AUTO: 0.4 % (ref 0–1.5)
BILIRUB SERPL-MCNC: 0.6 MG/DL (ref 0–1.2)
BILIRUB SERPL-MCNC: 0.7 MG/DL (ref 0–1.2)
BUN SERPL-MCNC: 47 MG/DL (ref 8–23)
BUN SERPL-MCNC: 50 MG/DL (ref 8–23)
BUN/CREAT SERPL: 4.3 (ref 7–25)
BUN/CREAT SERPL: 4.5 (ref 7–25)
CALCIUM SPEC-SCNC: 7.5 MG/DL (ref 8.6–10.5)
CALCIUM SPEC-SCNC: 8 MG/DL (ref 8.6–10.5)
CHLORIDE SERPL-SCNC: 96 MMOL/L (ref 98–107)
CHLORIDE SERPL-SCNC: 96 MMOL/L (ref 98–107)
CO2 SERPL-SCNC: 27 MMOL/L (ref 22–29)
CO2 SERPL-SCNC: 29.7 MMOL/L (ref 22–29)
CREAT SERPL-MCNC: 10.44 MG/DL (ref 0.76–1.27)
CREAT SERPL-MCNC: 11.71 MG/DL (ref 0.76–1.27)
DEPRECATED RDW RBC AUTO: 48.8 FL (ref 37–54)
DEPRECATED RDW RBC AUTO: 48.9 FL (ref 37–54)
EGFRCR SERPLBLD CKD-EPI 2021: 4.5 ML/MIN/1.73
EGFRCR SERPLBLD CKD-EPI 2021: 5.1 ML/MIN/1.73
EOSINOPHIL # BLD AUTO: 0.11 10*3/MM3 (ref 0–0.4)
EOSINOPHIL NFR BLD AUTO: 2.2 % (ref 0.3–6.2)
ERYTHROCYTE [DISTWIDTH] IN BLOOD BY AUTOMATED COUNT: 14.3 % (ref 12.3–15.4)
ERYTHROCYTE [DISTWIDTH] IN BLOOD BY AUTOMATED COUNT: 14.4 % (ref 12.3–15.4)
FERRITIN SERPL-MCNC: 753 NG/ML (ref 30–400)
GEN 5 1HR TROPONIN T REFLEX: 76 NG/L
GLOBULIN UR ELPH-MCNC: 2.6 GM/DL
GLOBULIN UR ELPH-MCNC: 2.9 GM/DL
GLUCOSE SERPL-MCNC: 107 MG/DL (ref 65–99)
GLUCOSE SERPL-MCNC: 87 MG/DL (ref 65–99)
HCT VFR BLD AUTO: 30 % (ref 37.5–51)
HCT VFR BLD AUTO: 33.3 % (ref 37.5–51)
HGB BLD-MCNC: 10.4 G/DL (ref 13–17.7)
HGB BLD-MCNC: 9.5 G/DL (ref 13–17.7)
HOLD SPECIMEN: NORMAL
HOLD SPECIMEN: NORMAL
IMM GRANULOCYTES # BLD AUTO: 0.01 10*3/MM3 (ref 0–0.05)
IMM GRANULOCYTES NFR BLD AUTO: 0.2 % (ref 0–0.5)
IRON 24H UR-MRATE: 23 MCG/DL (ref 59–158)
IRON SATN MFR SERPL: 11 % (ref 20–50)
LYMPHOCYTES # BLD AUTO: 0.61 10*3/MM3 (ref 0.7–3.1)
LYMPHOCYTES NFR BLD AUTO: 12.4 % (ref 19.6–45.3)
MCH RBC QN AUTO: 29.6 PG (ref 26.6–33)
MCH RBC QN AUTO: 29.9 PG (ref 26.6–33)
MCHC RBC AUTO-ENTMCNC: 31.2 G/DL (ref 31.5–35.7)
MCHC RBC AUTO-ENTMCNC: 31.7 G/DL (ref 31.5–35.7)
MCV RBC AUTO: 94.3 FL (ref 79–97)
MCV RBC AUTO: 94.9 FL (ref 79–97)
MONOCYTES # BLD AUTO: 0.72 10*3/MM3 (ref 0.1–0.9)
MONOCYTES NFR BLD AUTO: 14.7 % (ref 5–12)
NEUTROPHILS NFR BLD AUTO: 3.44 10*3/MM3 (ref 1.7–7)
NEUTROPHILS NFR BLD AUTO: 70.1 % (ref 42.7–76)
NRBC BLD AUTO-RTO: 0 /100 WBC (ref 0–0.2)
PLATELET # BLD AUTO: 128 10*3/MM3 (ref 140–450)
PLATELET # BLD AUTO: 148 10*3/MM3 (ref 140–450)
PMV BLD AUTO: 8.8 FL (ref 6–12)
PMV BLD AUTO: 9.2 FL (ref 6–12)
POTASSIUM SERPL-SCNC: 4.5 MMOL/L (ref 3.5–5.2)
POTASSIUM SERPL-SCNC: 4.5 MMOL/L (ref 3.5–5.2)
PROT SERPL-MCNC: 6.6 G/DL (ref 6–8.5)
PROT SERPL-MCNC: 7.5 G/DL (ref 6–8.5)
QT INTERVAL: 384 MS
QTC INTERVAL: 471 MS
RBC # BLD AUTO: 3.18 10*6/MM3 (ref 4.14–5.8)
RBC # BLD AUTO: 3.51 10*6/MM3 (ref 4.14–5.8)
SODIUM SERPL-SCNC: 136 MMOL/L (ref 136–145)
SODIUM SERPL-SCNC: 138 MMOL/L (ref 136–145)
TIBC SERPL-MCNC: 210 MCG/DL (ref 298–536)
TRANSFERRIN SERPL-MCNC: 141 MG/DL (ref 200–360)
TROPONIN T % DELTA: -3
TROPONIN T NUMERIC DELTA: -2 NG/L
TROPONIN T SERPL HS-MCNC: 78 NG/L
TROPONIN T SERPL HS-MCNC: 85 NG/L
WBC NRBC COR # BLD AUTO: 3.56 10*3/MM3 (ref 3.4–10.8)
WBC NRBC COR # BLD AUTO: 4.91 10*3/MM3 (ref 3.4–10.8)
WHOLE BLOOD HOLD COAG: NORMAL
WHOLE BLOOD HOLD SPECIMEN: NORMAL

## 2025-07-04 PROCEDURE — 36415 COLL VENOUS BLD VENIPUNCTURE: CPT | Performed by: NURSE PRACTITIONER

## 2025-07-04 PROCEDURE — 82728 ASSAY OF FERRITIN: CPT | Performed by: HOSPITALIST

## 2025-07-04 PROCEDURE — G0378 HOSPITAL OBSERVATION PER HR: HCPCS

## 2025-07-04 PROCEDURE — 85025 COMPLETE CBC W/AUTO DIFF WBC: CPT | Performed by: EMERGENCY MEDICINE

## 2025-07-04 PROCEDURE — 71045 X-RAY EXAM CHEST 1 VIEW: CPT

## 2025-07-04 PROCEDURE — 99203 OFFICE O/P NEW LOW 30 MIN: CPT | Performed by: INTERNAL MEDICINE

## 2025-07-04 PROCEDURE — 80053 COMPREHEN METABOLIC PANEL: CPT | Performed by: NURSE PRACTITIONER

## 2025-07-04 PROCEDURE — 84484 ASSAY OF TROPONIN QUANT: CPT | Performed by: EMERGENCY MEDICINE

## 2025-07-04 PROCEDURE — 85027 COMPLETE CBC AUTOMATED: CPT | Performed by: NURSE PRACTITIONER

## 2025-07-04 PROCEDURE — 80053 COMPREHEN METABOLIC PANEL: CPT | Performed by: EMERGENCY MEDICINE

## 2025-07-04 PROCEDURE — 93005 ELECTROCARDIOGRAM TRACING: CPT

## 2025-07-04 PROCEDURE — 84466 ASSAY OF TRANSFERRIN: CPT | Performed by: HOSPITALIST

## 2025-07-04 PROCEDURE — 83540 ASSAY OF IRON: CPT | Performed by: HOSPITALIST

## 2025-07-04 PROCEDURE — 93010 ELECTROCARDIOGRAM REPORT: CPT | Performed by: INTERNAL MEDICINE

## 2025-07-04 PROCEDURE — 93005 ELECTROCARDIOGRAM TRACING: CPT | Performed by: EMERGENCY MEDICINE

## 2025-07-04 PROCEDURE — 99285 EMERGENCY DEPT VISIT HI MDM: CPT

## 2025-07-04 PROCEDURE — 84484 ASSAY OF TROPONIN QUANT: CPT | Performed by: NURSE PRACTITIONER

## 2025-07-04 RX ORDER — CALCITRIOL 0.25 UG/1
0.25 CAPSULE, LIQUID FILLED ORAL DAILY
Status: DISCONTINUED | OUTPATIENT
Start: 2025-07-04 | End: 2025-07-05 | Stop reason: HOSPADM

## 2025-07-04 RX ORDER — BISACODYL 5 MG/1
5 TABLET, DELAYED RELEASE ORAL DAILY PRN
Status: DISCONTINUED | OUTPATIENT
Start: 2025-07-04 | End: 2025-07-05 | Stop reason: HOSPADM

## 2025-07-04 RX ORDER — SODIUM CHLORIDE 0.9 % (FLUSH) 0.9 %
10 SYRINGE (ML) INJECTION EVERY 12 HOURS SCHEDULED
Status: DISCONTINUED | OUTPATIENT
Start: 2025-07-04 | End: 2025-07-04

## 2025-07-04 RX ORDER — AMOXICILLIN 250 MG
2 CAPSULE ORAL 2 TIMES DAILY PRN
Status: DISCONTINUED | OUTPATIENT
Start: 2025-07-04 | End: 2025-07-05 | Stop reason: HOSPADM

## 2025-07-04 RX ORDER — POLYETHYLENE GLYCOL 3350 17 G/17G
17 POWDER, FOR SOLUTION ORAL DAILY PRN
Status: DISCONTINUED | OUTPATIENT
Start: 2025-07-04 | End: 2025-07-05 | Stop reason: HOSPADM

## 2025-07-04 RX ORDER — SODIUM CHLORIDE 0.9 % (FLUSH) 0.9 %
10 SYRINGE (ML) INJECTION AS NEEDED
Status: DISCONTINUED | OUTPATIENT
Start: 2025-07-04 | End: 2025-07-05 | Stop reason: HOSPADM

## 2025-07-04 RX ORDER — SODIUM CHLORIDE 0.9 % (FLUSH) 0.9 %
10 SYRINGE (ML) INJECTION AS NEEDED
Status: DISCONTINUED | OUTPATIENT
Start: 2025-07-04 | End: 2025-07-04

## 2025-07-04 RX ORDER — BISACODYL 10 MG
10 SUPPOSITORY, RECTAL RECTAL DAILY PRN
Status: DISCONTINUED | OUTPATIENT
Start: 2025-07-04 | End: 2025-07-05 | Stop reason: HOSPADM

## 2025-07-04 RX ORDER — NITROGLYCERIN 0.4 MG/1
0.4 TABLET SUBLINGUAL
Status: DISCONTINUED | OUTPATIENT
Start: 2025-07-04 | End: 2025-07-04

## 2025-07-04 RX ORDER — ASPIRIN 325 MG
325 TABLET ORAL ONCE
Status: COMPLETED | OUTPATIENT
Start: 2025-07-04 | End: 2025-07-04

## 2025-07-04 RX ORDER — ACETAMINOPHEN 325 MG/1
650 TABLET ORAL EVERY 4 HOURS PRN
Status: DISCONTINUED | OUTPATIENT
Start: 2025-07-04 | End: 2025-07-05 | Stop reason: HOSPADM

## 2025-07-04 RX ORDER — ONDANSETRON 2 MG/ML
4 INJECTION INTRAMUSCULAR; INTRAVENOUS EVERY 6 HOURS PRN
Status: DISCONTINUED | OUTPATIENT
Start: 2025-07-04 | End: 2025-07-05 | Stop reason: HOSPADM

## 2025-07-04 RX ORDER — ACETAMINOPHEN 650 MG/1
650 SUPPOSITORY RECTAL EVERY 4 HOURS PRN
Status: DISCONTINUED | OUTPATIENT
Start: 2025-07-04 | End: 2025-07-04

## 2025-07-04 RX ORDER — FAMOTIDINE 20 MG/1
20 TABLET, FILM COATED ORAL 2 TIMES DAILY PRN
Status: DISCONTINUED | OUTPATIENT
Start: 2025-07-04 | End: 2025-07-05 | Stop reason: HOSPADM

## 2025-07-04 RX ORDER — NITROGLYCERIN 0.4 MG/1
0.4 TABLET SUBLINGUAL
Status: DISCONTINUED | OUTPATIENT
Start: 2025-07-04 | End: 2025-07-05 | Stop reason: HOSPADM

## 2025-07-04 RX ORDER — SODIUM CHLORIDE 9 MG/ML
40 INJECTION, SOLUTION INTRAVENOUS AS NEEDED
Status: DISCONTINUED | OUTPATIENT
Start: 2025-07-04 | End: 2025-07-04

## 2025-07-04 RX ORDER — ACETAMINOPHEN 160 MG/5ML
650 SOLUTION ORAL EVERY 4 HOURS PRN
Status: DISCONTINUED | OUTPATIENT
Start: 2025-07-04 | End: 2025-07-04

## 2025-07-04 RX ADMIN — ASPIRIN 325 MG: 325 TABLET ORAL at 01:04

## 2025-07-04 RX ADMIN — NITROGLYCERIN 0.4 MG: 0.4 TABLET SUBLINGUAL at 01:04

## 2025-07-04 RX ADMIN — CALCITRIOL 0.25 MCG: 0.25 CAPSULE ORAL at 12:00

## 2025-07-04 RX ADMIN — Medication 5 MG: at 20:20

## 2025-07-04 NOTE — CONSULTS
[facility] Consult Note      Patient Name: Alonso Torres Sr.  : 1965  MRN: [id number]  Primary Care Physician:  Sherrell Litteljohn PA  Referring Physician: Nithya Lucero MD  Date of admission: 2025    Subjective     Reason for Consult:  ESRD     HPI:   Alonso Torres Sr. is a 59 y.o. male with ESRD on home hemodialysis (4x/week) via RUE AVF, HTN, PAF who presented overnight with chest pain.  Had recent stress test on 25 for transplant work up, along with echo showing mild LV dysfunction, EF 45 to 50%.  Also revealed mod AS/AI and e/o PHT (RVSP 69 mm Hg).  CXR unremarkable.  BP stable 125 to 140 systolic thus far.  States not actually taking listed antihypertensives (norvasc, metop XL).  He dialyzed yesterday.  K/HCo3 normal and BUN/Cr 47/10.  Tn elevated 78.  Denies n/v or diarrhea.  No dyspnea or swelling.      Review of Systems:   14 point review of systems is otherwise negative except for mentioned above on HPI    Personal History     Past Medical History:   Diagnosis Date    Dialysis patient     4 days a week    End stage kidney disease     Fistula     RIGHT ARM    Gout     HISTORY    History of 2019 novel coronavirus disease (COVID-19) 2020    History of atrial fibrillation     History of transfusion     1 UNIT, NO REACTIONS    Hypertension     Joint pain     Peritoneal dialysis catheter in place     Psoriasis     S/P removal of parathyroid gland     Vitamin D deficiency        Past Surgical History:   Procedure Laterality Date    COLONOSCOPY      COLONOSCOPY N/A 2021    Procedure: COLONOSCOPY to cecum with Injection of NS 3cc, Polypectomy;  Surgeon: Morgan Coyne MD;  Location: [facility] ENDOSCOPY;  Service: General;  Laterality: N/A;  Screening  Polyp, Diverticulosis, Hemorrhoids    DIALYSIS FISTULA CREATION Right 2021    INGUINAL HERNIA REPAIR Bilateral     age 15    INSERT / REPLACE / VENOUS ACCESS CATHETER Right     DIALYSIS CATHETER  PLACED RT     INSERTION PERITONEAL DIALYSIS CATHETER N/A 02/25/2021    Procedure: INSERTION PERITONEAL DIALYSIS CATHETER LAPAROSCOPIC;  Surgeon: Morgan Coyne MD;  Location: [facility] MAIN OR;  Service: General;  Laterality: N/A;    PARATHYROIDECTOMY Bilateral     REMOVAL PERITONEAL DIALYSIS CATHETER N/A 08/12/2021    Procedure: REMOVAL PERITONEAL DIALYSIS CATHETER;  Surgeon: Morgan Coyne MD;  Location:  [facility];  Service: General;  Laterality: N/A;       Family History: family history includes Colon cancer in his father; Depression in his mother; Kidney disease in an other family member; Mental illness in his mother.    Social History:  reports that he has never smoked. He has never used smokeless tobacco. He reports that he does not drink alcohol and does not use drugs.    Home Medications:  Prior to Admission medications    Medication Sig Start Date End Date Taking? Authorizing Provider   calcitriol (ROCALTROL) 0.25 MCG capsule Take 1 capsule by mouth Daily.   Yes Susan Hughes MD   Methoxy PEG-Epoetin Beta (MIRCERA IJ) Inject 75 mcg under the skin into the appropriate area as directed. WITH DIALYSIS 5/20/21  Yes ProviderSusan MD   allopurinol (Zyloprim) 100 MG tablet Take 1 tablet by mouth Daily. 5/24/21   Fausto Boone MD   amLODIPine (NORVASC) 10 MG tablet Take 1 tablet by mouth Daily.  Patient taking differently: Take 1 tablet by mouth Every Morning. 5/24/21   Fausto Boone MD   Chlorhexidine Gluconate 2 % pads Apply  topically. As directed pre op    ProviderSusan MD   cinacalcet (Sensipar) 60 MG tablet Take 1 tablet by mouth Daily. 4/13/21   ProviderSusan MD   metoprolol succinate XL (TOPROL-XL) 50 MG 24 hr tablet Take 1 tablet by mouth Daily.  Patient taking differently: Take 1 tablet by mouth Every Morning. 5/24/21   Fausto Boone MD   sevelamer (RENVELA) 800 MG tablet Take 1 tablet by mouth 3 (Three) Times a Day With Meals. 10/20/20    Provider, MD Susan   Sildenafil Citrate (VIAGRA PO) Take 0.5 tablets by mouth As Needed. HOLD FOR 48 HRS PRIOR TO SURGERY    Provider, MD Susan       Allergies:  Allergies   Allergen Reactions    Nsaids Other (See Comments)     DUE TO CHRONIC KIDNEY DISEASE       Objective     Vitals:   Temp:  [98.1 °F (36.7 °C)-99.3 °F (37.4 °C)] 98.6 °F (37 °C)  Heart Rate:  [75-93] 75  Resp:  [14-16] 16  BP: (125-140)/(77-88) 125/77    Intake/Output Summary (Last 24 hours) at 7/4/2025 0916  Last data filed at 7/4/2025 0721  Gross per 24 hour   Intake 0 ml   Output --   Net 0 ml       Physical Exam:    General Appearance pleasant AAM on RA   Skin: warm and dry  HEENT: oral mucosa normal, nonicteric sclera  Neck: supple, no JVD  Lungs: CTA bilat no rales  Heart: RRR, normal S1 and S2  Abdomen: soft, nontender, nondistended  : no palpable bladder  Extremities: no edema, RUE AVF +T/B  Neuro: normal speech and mental status     Scheduled Meds:     calcitriol, 0.25 mcg, Oral, Daily  melatonin, 5 mg, Oral, Nightly      IV Meds:        Results Reviewed:   I have personally reviewed the results from the time of this admission to 7/4/2025 09:16 EDT     Lab Results   Component Value Date    GLUCOSE 107 (H) 07/04/2025    CALCIUM 8.0 (L) 07/04/2025     07/04/2025    K 4.5 07/04/2025    CO2 29.7 (H) 07/04/2025    CL 96 (L) 07/04/2025    BUN 47.0 (H) 07/04/2025    CREATININE 10.44 (H) 07/04/2025    EGFRIFAFRI 6 (L) 03/08/2022    EGFRIFNONA  08/10/2021      Comment:      <15 Indicative of kidney failure.    BCR 4.5 (L) 07/04/2025    ANIONGAP 12.3 07/04/2025      Lab Results   Component Value Date    MG 2.1 03/04/2025    ALBUMIN 4.6 07/04/2025           Assessment / Plan     ASSESSMENT:  ESRD on HHD via RUE AVF.  Dialyzed yesterday.  Volume/lytes stable.    HTN - BP controlled, states not actually taking listed meds (norvasc/metop).  We may want to subs ACE/ARB or entresto in light of echo findings (though not sure he'd  tolerate both beta blocker and this)  Chest pain, elevated Tn - CARD to see.    HFrEF, VHD, PHT - recent echo: EF 45 - 50%, mod AS/AI, RVSP 69 mm Hg  Anemia of CKD, hgb stable 10.4.  TSAT low 11%  MBD on renvela as phos binder and sensipar/calcitriol for secondary HPT    PLAN:  HD tomorrow  Cardiology eval - defer GDMT preferences to them ie start beta blocker vs ACE/ARB first   D/W RN    Thank you for involving us in the care of Alonso Torres Sr..  Please feel free to call with any questions.    Ulices Iyer MD  07/04/25  09:16 EDT    [facility]  [phone]

## 2025-07-04 NOTE — PLAN OF CARE
Goal Outcome Evaluation:  Plan of Care Reviewed With: patient        Progress: no change  Outcome Evaluation: VSS, no c/o pain, AM ECG and labs ordered, cardio and nephro consulted, plan for dialysis tomorrow, diet started and NPO at midnight, pt needs met at this time

## 2025-07-04 NOTE — PROGRESS NOTES
Spoke with primary RN.  AUTUMN n.p.o.  Healthy heart diet ordered.  No plans for cardiac catheterization today per Dr. Valenzuela's note

## 2025-07-04 NOTE — CONSULTS
[facility]  Cardiology Consult Note    Patient Identification:  Name: Alonso Torres Sr.  Age: 59 y.o.  Sex: male  :  1965  MRN: [id number]             Requesting Physician: Dr. Del Angel    Reason for Consultation / Chief Complaint: management recommendations chest pain    History of Present Illness:   59-year-old male came into the emergency room complaining of right-sided dull aching chest pain at rest, patient recently was admitted at [facility] the patient had a cardiac workup done including stress test and echocardiogram which revealed previous scar with no ischemia and cardiomyopathy EF of 45 to 50%, it is not clear whether the patient was suggested to have diagnostic heart catheterization or not    Patient also has a renal failure on dialysis    Significant history of hypertension    Comorbid cardiac risk factors:     Past Medical History:  Past Medical History:   Diagnosis Date    Dialysis patient     4 days a week    End stage kidney disease     Fistula     RIGHT ARM    Gout     HISTORY    History of 2019 novel coronavirus disease (COVID-19) 2020    History of atrial fibrillation     History of transfusion     1 UNIT, NO REACTIONS    Hypertension     Joint pain     Peritoneal dialysis catheter in place     Psoriasis     S/P removal of parathyroid gland     Vitamin D deficiency      Past Surgical History:  Past Surgical History:   Procedure Laterality Date    COLONOSCOPY      COLONOSCOPY N/A 2021    Procedure: COLONOSCOPY to cecum with Injection of NS 3cc, Polypectomy;  Surgeon: Morgan Coyne MD;  Location: [facility] ENDOSCOPY;  Service: General;  Laterality: N/A;  Screening  Polyp, Diverticulosis, Hemorrhoids    DIALYSIS FISTULA CREATION Right 2021    INGUINAL HERNIA REPAIR Bilateral     age 15    INSERT / REPLACE / VENOUS ACCESS CATHETER Right     DIALYSIS CATHETER PLACED RT     INSERTION PERITONEAL DIALYSIS CATHETER N/A 2021    Procedure: INSERTION  PERITONEAL DIALYSIS CATHETER LAPAROSCOPIC;  Surgeon: Morgan Coyne MD;  Location: [facility] MAIN OR;  Service: General;  Laterality: N/A;    PARATHYROIDECTOMY Bilateral     REMOVAL PERITONEAL DIALYSIS CATHETER N/A 08/12/2021    Procedure: REMOVAL PERITONEAL DIALYSIS CATHETER;  Surgeon: Morgan Coyne MD;  Location:  [facility];  Service: General;  Laterality: N/A;      Allergies:  Allergies   Allergen Reactions    Nsaids Other (See Comments)     DUE TO CHRONIC KIDNEY DISEASE     Home Meds:  Medications Prior to Admission   Medication Sig Dispense Refill Last Dose/Taking    calcitriol (ROCALTROL) 0.25 MCG capsule Take 1 capsule by mouth Daily.   7/3/2025    Methoxy PEG-Epoetin Beta (MIRCERA IJ) Inject 75 mcg under the skin into the appropriate area as directed. WITH DIALYSIS   6/23/2025    allopurinol (Zyloprim) 100 MG tablet Take 1 tablet by mouth Daily. 30 tablet 5     amLODIPine (NORVASC) 10 MG tablet Take 1 tablet by mouth Daily. (Patient taking differently: Take 1 tablet by mouth Every Morning.) 30 tablet 5     Chlorhexidine Gluconate 2 % pads Apply  topically. As directed pre op       cinacalcet (Sensipar) 60 MG tablet Take 1 tablet by mouth Daily.       metoprolol succinate XL (TOPROL-XL) 50 MG 24 hr tablet Take 1 tablet by mouth Daily. (Patient taking differently: Take 1 tablet by mouth Every Morning.) 30 tablet 5     sevelamer (RENVELA) 800 MG tablet Take 1 tablet by mouth 3 (Three) Times a Day With Meals.       Sildenafil Citrate (VIAGRA PO) Take 0.5 tablets by mouth As Needed. HOLD FOR 48 HRS PRIOR TO SURGERY        Current Meds:   [unfilled]  Social History:   Social History     Tobacco Use    Smoking status: Never    Smokeless tobacco: Never   Substance Use Topics    Alcohol use: Never      Family History:  Family History   Problem Relation Age of Onset    Depression Mother     Mental illness Mother     Colon cancer Father     Kidney disease Other     Aortic aneurysm Neg Hx     Malig  Hyperthermia Neg Hx       Review of Systems  Constitutional: No wt loss, fever   Gastrointestinal: No nausea , abdominal pain  Behavioral/Psych: No insomnia or anxiety   Cardiovascular ----positive for chest pain. All other systems reviewed and are negative            Constitutional:  Temp:  [98.1 °F (36.7 °C)-99.3 °F (37.4 °C)] 98.6 °F (37 °C)  Heart Rate:  [75-93] 75  Resp:  [14-16] 16  BP: (125-140)/(77-88) 125/77    Physical Exam   General:  Appears in no acute distress  Eyes: PERTL,  HEENT:  No JVD. Thyroid not visibly enlarged. No mucosal pallor or cyanosis  Respiratory: Respirations regular and unlabored at rest. BBS with good air entry in all fields. No crackles, rubs or wheezes auscultated  Cardiovascular: S1S2 Regular rate and rhythm. No murmur, rub or gallop auscultated. No carotid bruits. DP/PT pulses    . No pretibial pitting edema  Gastrointestinal: Abdomen soft, flat, non tender. Bowel sounds present. No hepatosplenomegaly. No ascites  Musculoskeletal: GOVEA x4. No abnormal movements  Extremities: No digital clubbing or cyanosis  Skin: Color pink. Skin warm and dry to touch. No rashes  No xanthoma  Neuro: AAO x3 CN II-XII grossly intact              Cardiographics  ECG:     Telemetry:    Echocardiogram:     Imaging  Chest X-ray:     Lab Review   Results from last 7 days   Lab Units 07/04/25  0205 07/04/25  0054   HSTROP T ng/L 76* 78*         Results from last 7 days   Lab Units 07/04/25  0054   SODIUM mmol/L 138   POTASSIUM mmol/L 4.5   BUN mg/dL 47.0*   CREATININE mg/dL 10.44*   CALCIUM mg/dL 8.0*     @[id number]@  Results from last 7 days   Lab Units 07/04/25  0054   WBC 10*3/mm3 4.91   HEMOGLOBIN g/dL 10.4*   HEMATOCRIT % 33.3*   PLATELETS 10*3/mm3 148             Assessment:  Atypical chest pain  Abnormal cardiac function test  Cardiomyopathy  Renal failure  Dialysis  Elevated troponin most likely due to renal failure  Anemia  Hypertension  Recommendations / Plan:   59-year-old male came into the  emergency room complaining of atypical right-sided nonexertional chest pain, EKG shows no acute changes, troponins are elevated but patient also has renal failure    Recent stress test shows abnormal cardiac function test with predominantly scar with mild to moderate LV dysfunction    Ejection fraction per echocardiogram has been 45 to 50%    Currently patient is asymptomatic, blood pressures are normal, patient will be started on Coreg for cardiomyopathy    Patient should be on ACE/ARB/Entresto if okay with renal      Patient should have diagnostic heart catheterization, options were discussed patient would like to have the heart catheterization done by the primary cardiologist patient follows at [facility].    Will repeat EKG and troponins in the morning      Cory Valenzuela MD  7/4/2025, 07:57 EDT      EMR Dragon/Transcription:   Dictated utilizing Dragon dictation

## 2025-07-04 NOTE — H&P
Patient Name:  Alonso Torres Sr.  YOB: 1965  MRN:  [id number]  Admit Date:  7/4/2025  Patient Care Team:  Sherrell Littlejohn PA as PCP - General (Nurse Practitioner)  Fausto Karimi MD as Consulting Physician (Hematology and Oncology)  Kit Vides MD as Consulting Physician (Nephrology)  Ant Bang (Internal Medicine)  Morgan Coyne MD as Consulting Physician (General Surgery)      Subjective   History Present Illness     Chief Complaint   Patient presents with    Chest Pain       Mr. Torres is a 59-year-old male with history of HTN, ESRD on home dialysis, and paroxysmal atrial fibrillation presents with chest pain. The pain began around noon the prior day while at rest and was described as a sharp discomfort across the upper chest, lasting several hours and relieved with nitroglycerin in the ED. He denies current chest pain, shortness of breath, nausea, diaphoresis, palpitations, or syncope. He performs home dialysis 5 days/week, with last session on 7/3. Recent nuclear stress test from transplant evaluation showed moderately reduced LV systolic function with stress-induced hypoperfusion and underlying scar. Echocardiogram in May showed EF 45-50%, diffuse hypokinesis, severe left atrial dilation, and pulmonary hypertension.       Chest Pain           Review of Systems   Cardiovascular:  Positive for chest pain.        Personal History     Past Medical History:   Diagnosis Date    Dialysis patient     4 days a week    End stage kidney disease     Fistula     RIGHT ARM    Gout     HISTORY    History of 2019 novel coronavirus disease (COVID-19) 07/2020    History of atrial fibrillation     History of transfusion     1 UNIT, NO REACTIONS    Hypertension     Joint pain     Peritoneal dialysis catheter in place     Psoriasis     S/P removal of parathyroid gland     Vitamin D deficiency      Past Surgical History:   Procedure Laterality Date    COLONOSCOPY  2008    COLONOSCOPY N/A  02/18/2021    Procedure: COLONOSCOPY to cecum with Injection of NS 3cc, Polypectomy;  Surgeon: Morgan Coyne MD;  [facility];  Service: General;  Laterality: N/A;  Screening  Polyp, Diverticulosis, Hemorrhoids    DIALYSIS FISTULA CREATION Right 02/2021    INGUINAL HERNIA REPAIR Bilateral     age 15    INSERT / REPLACE / VENOUS ACCESS CATHETER Right     DIALYSIS CATHETER PLACED RT     INSERTION PERITONEAL DIALYSIS CATHETER N/A 02/25/2021    Procedure: INSERTION PERITONEAL DIALYSIS CATHETER LAPAROSCOPIC;  Surgeon: Morgan Coyne MD;  [facility];  Service: General;  Laterality: N/A;    PARATHYROIDECTOMY Bilateral     REMOVAL PERITONEAL DIALYSIS CATHETER N/A 08/12/2021    Procedure: REMOVAL PERITONEAL DIALYSIS CATHETER;  Surgeon: Morgan Coyne MD;  Location:  [facility];  Service: General;  Laterality: N/A;     Family History   Problem Relation Age of Onset    Depression Mother     Mental illness Mother     Colon cancer Father     Kidney disease Other     Aortic aneurysm Neg Hx     Malig Hyperthermia Neg Hx      Social History     Tobacco Use    Smoking status: Never    Smokeless tobacco: Never   Vaping Use    Vaping status: Never Used   Substance Use Topics    Alcohol use: Never    Drug use: Never     No current facility-administered medications on file prior to encounter.     Current Outpatient Medications on File Prior to Encounter   Medication Sig Dispense Refill    calcitriol (ROCALTROL) 0.25 MCG capsule Take 1 capsule by mouth Daily.      Methoxy PEG-Epoetin Beta (MIRCERA IJ) Inject 75 mcg under the skin into the appropriate area as directed. WITH DIALYSIS      allopurinol (Zyloprim) 100 MG tablet Take 1 tablet by mouth Daily. 30 tablet 5    amLODIPine (NORVASC) 10 MG tablet Take 1 tablet by mouth Daily. (Patient taking differently: Take 1 tablet by mouth Every Morning.) 30 tablet 5    Chlorhexidine Gluconate 2 % pads Apply  topically. As directed  pre op      cinacalcet (Sensipar) 60 MG tablet Take 1 tablet by mouth Daily.      metoprolol succinate XL (TOPROL-XL) 50 MG 24 hr tablet Take 1 tablet by mouth Daily. (Patient taking differently: Take 1 tablet by mouth Every Morning.) 30 tablet 5    sevelamer (RENVELA) 800 MG tablet Take 1 tablet by mouth 3 (Three) Times a Day With Meals.      Sildenafil Citrate (VIAGRA PO) Take 0.5 tablets by mouth As Needed. HOLD FOR 48 HRS PRIOR TO SURGERY       Allergies   Allergen Reactions    Nsaids Other (See Comments)     DUE TO CHRONIC KIDNEY DISEASE       Objective    Objective     Vital Signs  Temp:  [98.1 °F (36.7 °C)-99.3 °F (37.4 °C)] 98.6 °F (37 °C)  Heart Rate:  [75-93] 75  Resp:  [14-16] 16  BP: (125-140)/(77-88) 125/77  SpO2:  [96 %-100 %] 100 %  on   ;   Device (Oxygen Therapy): room air  Body mass index is 27.33 kg/m².    Physical Exam  Vitals and nursing note reviewed.   Constitutional:       Appearance: He is well-developed.   HENT:      Head: Normocephalic and atraumatic.   Eyes:      General: No scleral icterus.  Cardiovascular:      Rate and Rhythm: Normal rate and regular rhythm.      Heart sounds: Murmur heard.   Pulmonary:      Effort: Pulmonary effort is normal.      Breath sounds: Normal breath sounds.   Abdominal:      General: Bowel sounds are normal. There is no distension.      Palpations: Abdomen is soft.      Tenderness: There is no abdominal tenderness.   Skin:     General: Skin is warm and dry.   Neurological:      Mental Status: He is alert. Mental status is at baseline.         Results Review:  I reviewed the patient's new clinical results.  I reviewed the patient's new imaging results and agree with the interpretation.  I reviewed the patient's other test results and agree with the interpretation  I personally viewed and interpreted the patient's EKG/Telemetry data  Discussed with ED provider.    Lab Results (last 24 hours)       Procedure Component Value Units Date/Time    CBC & Differential  [[id number]]  (Abnormal) Collected: 07/04/25 0054    Specimen: Blood Updated: 07/04/25 0118    Narrative:      The following orders were created for panel order CBC & Differential.  Procedure                               Abnormality         Status                     ---------                               -----------         ------                     CBC Auto Differential[[id number]]        Abnormal            Final result                 Please view results for these tests on the individual orders.    Comprehensive Metabolic Panel [[id number]]  (Abnormal) Collected: 07/04/25 0054    Specimen: Blood Updated: 07/04/25 0129     Glucose 107 mg/dL      BUN 47.0 mg/dL      Creatinine 10.44 mg/dL      Sodium 138 mmol/L      Potassium 4.5 mmol/L      Chloride 96 mmol/L      CO2 29.7 mmol/L      Calcium 8.0 mg/dL      Total Protein 7.5 g/dL      Albumin 4.6 g/dL      ALT (SGPT) 15 U/L      AST (SGOT) 12 U/L      Alkaline Phosphatase 82 U/L      Total Bilirubin 0.6 mg/dL      Globulin 2.9 gm/dL      A/G Ratio 1.6 g/dL      BUN/Creatinine Ratio 4.5     Anion Gap 12.3 mmol/L      eGFR 5.1 mL/min/1.73     Narrative:      GFR Categories in Chronic Kidney Disease (CKD)              GFR Category          GFR (mL/min/1.73)    Interpretation  G1                    90 or greater        Normal or high (1)  G2                    60-89                Mild decrease (1)  G3a                   45-59                Mild to moderate decrease  G3b                   30-44                Moderate to severe decrease  G4                    15-29                Severe decrease  G5                    14 or less           Kidney failure    (1)In the absence of evidence of kidney disease, neither GFR category G1 or G2 fulfill the criteria for CKD.    eGFR calculation 2021 CKD-EPI creatinine equation, which does not include race as a factor    High Sensitivity Troponin T [[id number]]  (Abnormal) Collected: 07/04/25 0054    Specimen: Blood Updated:  07/04/25 0152     HS Troponin T 78 ng/L     Narrative:      High Sensitive Troponin T Reference Range:  <14.0 ng/L- Negative Female for AMI  <22.0 ng/L- Negative Male for AMI  >=14 - Abnormal Female indicating possible myocardial injury.  >=22 - Abnormal Male indicating possible myocardial injury.   Clinicians would have to utilize clinical acumen, EKG, Troponin, and serial changes to determine if it is an Acute Myocardial Infarction or myocardial injury due to an underlying chronic condition.         CBC Auto Differential [[id number]]  (Abnormal) Collected: 07/04/25 0054    Specimen: Blood Updated: 07/04/25 0118     WBC 4.91 10*3/mm3      RBC 3.51 10*6/mm3      Hemoglobin 10.4 g/dL      Hematocrit 33.3 %      MCV 94.9 fL      MCH 29.6 pg      MCHC 31.2 g/dL      RDW 14.3 %      RDW-SD 48.8 fl      MPV 8.8 fL      Platelets 148 10*3/mm3      Neutrophil % 70.1 %      Lymphocyte % 12.4 %      Monocyte % 14.7 %      Eosinophil % 2.2 %      Basophil % 0.4 %      Immature Grans % 0.2 %      Neutrophils, Absolute 3.44 10*3/mm3      Lymphocytes, Absolute 0.61 10*3/mm3      Monocytes, Absolute 0.72 10*3/mm3      Eosinophils, Absolute 0.11 10*3/mm3      Basophils, Absolute 0.02 10*3/mm3      Immature Grans, Absolute 0.01 10*3/mm3      nRBC 0.0 /100 WBC     High Sensitivity Troponin T 1Hr [[id number]]  (Abnormal) Collected: 07/04/25 0205    Specimen: Blood Updated: 07/04/25 0249     HS Troponin T 76 ng/L      Troponin T Numeric Delta -2 ng/L      Troponin T % Delta -3    Narrative:      High Sensitive Troponin T Reference Range:  <14.0 ng/L- Negative Female for AMI  <22.0 ng/L- Negative Male for AMI  >=14 - Abnormal Female indicating possible myocardial injury.  >=22 - Abnormal Male indicating possible myocardial injury.   Clinicians would have to utilize clinical acumen, EKG, Troponin, and serial changes to determine if it is an Acute Myocardial Infarction or myocardial injury due to an underlying chronic condition.                  Imaging Results (Last 24 Hours)       Procedure Component Value Units Date/Time    XR Chest 1 View [[id number]] Collected: 07/04/25 0114     Updated: 07/04/25 0118    Narrative:      CXR ONE VIEW      HISTORY: Chest Pain Triage Protocol     COMPARISON: None     TECHNIQUE: single portable AP       Impression:         There is cardiomegaly. There is moderate elevation of the right  hemidiaphragm.     There is no consolidation, effusion or pneumothorax. There is no  suspicious nodule.           This report was finalized on 7/4/2025 1:15 AM by Dr. Bahman Delatorre M.D  on Workstation: [id number]                 Telemetry Scan   Final Result      ECG 12 Lead ED Triage Standing Order; Chest Pain   Preliminary Result   HEART RATE=91  bpm   RR Virjxsez=396  ms   PA Orjiyzou=926  ms   P Horizontal Axis=8  deg   P Front Axis=64  deg   QRSD Overxaju=046  ms   QT Csioxvmp=130  ms   IXbR=911  ms   QRS Axis=-34  deg   T Wave Axis=90  deg   - ABNORMAL ECG -   Sinus rhythm   Prolonged PA interval   LVH with secondary repolarization abnormality   When compared with ECG of 10-Aug-2021 14:22:46,   New conduction abnormality   Date and Time of Study:2025-07-04 00:37:30        Assessment/Plan   Assessment & Plan   Active Hospital Problems    Diagnosis  POA    **NSTEMI (non-ST elevated myocardial infarction) [I21.4]  Yes    Abnormal cardiovascular stress test [R94.39]  Yes    Heart failure with reduced ejection fraction [I50.20]  Yes    Aortic stenosis [I35.0]  Yes    Anemia in chronic kidney disease [N18.9, D63.1]  Yes    End stage renal disease [N18.6]  Yes    PAF (paroxysmal atrial fibrillation) [I48.0]  Yes    Hypertension [I10]  Yes      Resolved Hospital Problems   No resolved problems to display.       Non-ST elevation myocardial infarction (NSTEMI)  -EKG demonstrates no ST elevations.  -Troponins elevated at 78 trend down to 76 ng/L.  -Pattern suggests Type 2 NSTEMI in the setting of demand ischemia and  cardiomyopathy.  -Monitor serial troponins and telemetry.  -Cardiology contacted by the ED.  Did not recommend heparin infusion at that time and stated they will see this morning.  Patient currently chest pain-free.  Received full dose aspirin in ED.  Suspect will need cardiac catheterization.    Heart failure with reduced ejection fraction (HFrEF)  -Stress test and echo both show reduced EF with diffuse hypokinesis  -Will defer initiation of GDMT to cardiology and nephrology.  -Strict volume and BP management given dialysis dependence.    End-stage renal disease on home dialysis  -Home dialysis last performed 7/3; nephrology to see.  -Patient currently being evaluated for consideration of renal transplant.    Anemia of CKD  -Hemoglobin 10.4, consistent with baseline.  -Check iron studies.    Pulmonary hypertension per echo  -Echo estimated RVSP 69 mmHg.    Moderate valvular disease  -Echo shows moderate aortic and mild mitral stenosis with associated regurgitation.  - Cardiology to see for consideration of further valve assessment as part of transplant workup.    Paroxysmal atrial fibrillation  -Sinus rhythm on current EKG. No recent symptoms.  -Defer medications to cardiology.  Not on AC.  Years ago was on warfarin.      SCDs for DVT prophylaxis.  Full code.  Discussed with patient.      Geovanny Del Angel MD  [facility]  07/04/25  08:40 EDT

## 2025-07-04 NOTE — CASE MANAGEMENT/SOCIAL WORK
Discharge Planning Assessment  [facility]     Patient Name: Alonso Torres Sr.  MRN: [id number]  Today's Date: 7/4/2025    Admit Date: 7/4/2025    Plan: Home with family to transport   Discharge Needs Assessment       Row Name 07/04/25 1323       Living Environment    People in Home spouse    Current Living Arrangements home    Potentially Unsafe Housing Conditions none    In the past 12 months has the electric, gas, oil, or water company threatened to shut off services in your home? No    Primary Care Provided by self    Provides Primary Care For no one    Family Caregiver if Needed spouse    Able to Return to Prior Arrangements yes       Resource/Environmental Concerns    Resource/Environmental Concerns none    Transportation Concerns none       Transportation Needs    In the past 12 months, has lack of transportation kept you from medical appointments or from getting medications? no    In the past 12 months, has lack of transportation kept you from meetings, work, or from getting things needed for daily living? No       Food Insecurity    Within the past 12 months, you worried that your food would run out before you got the money to buy more. Never true    Within the past 12 months, the food you bought just didn't last and you didn't have money to get more. Never true       Transition Planning    Patient/Family Anticipates Transition to home with family    Patient/Family Anticipated Services at Transition none    Transportation Anticipated family or friend will provide       Discharge Needs Assessment    Readmission Within the Last 30 Days no previous admission in last 30 days    Equipment Currently Used at Home none    Concerns to be Addressed discharge planning;denies needs/concerns at this time    Do you want help finding or keeping work or a job? I do not need or want help    Do you want help with school or training? For example, starting or completing job training or getting a high school diploma, GED or  equivalent No    Anticipated Changes Related to Illness none    Equipment Needed After Discharge none                   Discharge Plan       Row Name 07/04/25 1325       Plan    Plan Home with family to transport    Roadmap to Recovery Yes    Patient/Family in Agreement with Plan yes    Plan Comments Spoke with patient at bedside. Introduced self and explained CCP role. Verified face sheet and local pharmacy is [facility], patient choice to enroll in [facility]. Patient denies difficulty with medication cost/ management. Patient lives with spouse in s/s home with a basement with 0 ALEXI. Patient denies HH, SNF, and DME history. Patient is IADL, works full-time, and drives. Patient plan is to return home with family to transport,                  Continued Care and Services - Admitted Since 7/4/2025    No active coordination exists.       Expected Discharge Date and Time       Expected Discharge Date Expected Discharge Time    Jul 6, 2025            Demographic Summary       Row Name 07/04/25 1322       General Information    Admission Type observation    Arrived From emergency department;home    Required Notices Provided Observation Status Notice    Referral Source admission list    Reason for Consult discharge planning    Preferred Language English                   Functional Status       Row Name 07/04/25 1323       Functional Status    Usual Activity Tolerance good    Current Activity Tolerance moderate       Physical Activity    On average, how many days per week do you engage in moderate to strenuous exercise (like a brisk walk)? 0 days    On average, how many minutes do you engage in exercise at this level? 0 min    Number of minutes of exercise per week 0       Functional Status, IADL    Medications independent    Meal Preparation independent    Housekeeping independent    Laundry independent    Shopping independent    If for any reason you need help with day-to-day activities such as bathing, preparing meals,  shopping, managing finances, etc., do you get the help you need? I get all the help I need       Mental Status    General Appearance WDL WDL       Mental Status Summary    Recent Changes in Mental Status/Cognitive Functioning no changes       Employment/    Employment Status employed full-time    Shift Worked first shift                   Psychosocial    No documentation.                  Abuse/Neglect    No documentation.                  Legal       Row Name 07/04/25 132       Financial Resource Strain    How hard is it for you to pay for the very basics like food, housing, medical care, and heating? Not very       Financial/Legal    Source of Income salary/wages    Financial/Environmental Concerns none       Legal    Criminal Activity/Legal Involvement none                   Substance Abuse    No documentation.                  Patient Forms    No documentation.                     Charley Boyd RN

## 2025-07-04 NOTE — ED PROVIDER NOTES
EMERGENCY DEPARTMENT ENCOUNTER    History  Chief Complaint   Patient presents with    Chest Pain       History provided by: Patient    HPI:  Context: Alonso Torres Sr. is a 59 y.o. male with a medical history of hypertension, CKD, PAF who presents to the ED c/o acute chest pain.  He notes that symptoms started about 1 PM when he was hooking himself up to home hemodialysis.  He notes diffuse chest tightness.  Symptoms were initially an 9 out of 10, now more 6 out of 10.  He does dialysis at least 4 days a week at home.  He is currently being evaluated for transplant.  He does report having a stress test up at the [facility] recently, it was reportedly abnormal, but he was not symptomatic at that time.  No recent long distance travel.  No leg swelling.  No fever, cough or congestion.      Past Medical History:  Active Ambulatory Problems     Diagnosis Date Noted    Hypertension 06/13/2017    Gout 06/13/2017    CKD (chronic kidney disease) stage 4, GFR 15-29 ml/min 06/13/2017    Colon polyps 02/18/2021    Diverticulosis 02/18/2021    Internal hemorrhoids 02/18/2021    Over weight 02/18/2021    Anemia in chronic kidney disease 09/27/2020    End stage renal disease 09/27/2020    Hypercalcemia 09/30/2020    Secondary hyperparathyroidism of renal origin 09/27/2020    PAF (paroxysmal atrial fibrillation) 08/27/2017     Resolved Ambulatory Problems     Diagnosis Date Noted    Screening for colon cancer 12/19/2017    Screening for colon cancer 01/26/2021    Peritoneal dialysis catheter in place 02/25/2021     Past Medical History:   Diagnosis Date    Dialysis patient     End stage kidney disease     Fistula     History of 2019 novel coronavirus disease (COVID-19) 07/2020    History of atrial fibrillation     History of transfusion     Joint pain     Psoriasis     Vitamin D deficiency        Past Surgical History:  Past Surgical History:   Procedure Laterality Date    CARDIAC ABLATION  2017    COLONOSCOPY  2008     COLONOSCOPY N/A 2/18/2021    Procedure: COLONOSCOPY to cecum with Injection of NS 3cc, Polypectomy;  Surgeon: Morgan Coyne MD;  [facility];  Service: General;  Laterality: N/A;  Screening  Polyp, Diverticulosis, Hemorrhoids    DIALYSIS FISTULA CREATION Right 02/2021    INGUINAL HERNIA REPAIR Bilateral     age 15    INSERT / REPLACE / VENOUS ACCESS CATHETER Right     DIALYSIS CATHETER PLACED RT     INSERTION PERITONEAL DIALYSIS CATHETER N/A 2/25/2021    Procedure: INSERTION PERITONEAL DIALYSIS CATHETER LAPAROSCOPIC;  Surgeon: Morgan Coyne MD;  Location: [facility] MAIN OR;  Service: General;  Laterality: N/A;    REMOVAL PERITONEAL DIALYSIS CATHETER N/A 8/12/2021    Procedure: REMOVAL PERITONEAL DIALYSIS CATHETER;  Surgeon: Morgan Coyne MD;  Location:  [facility];  Service: General;  Laterality: N/A;         Family History:  Family History   Problem Relation Age of Onset    Depression Mother     Mental illness Mother     Colon cancer Father     Kidney disease Other     Aortic aneurysm Neg Hx     Malig Hyperthermia Neg Hx          Social History:  Social History     Socioeconomic History    Marital status:    Tobacco Use    Smoking status: Never    Smokeless tobacco: Never   Vaping Use    Vaping status: Never Used   Substance and Sexual Activity    Alcohol use: Never    Drug use: Never    Sexual activity: Defer         Allergies:  Nsaids and Ace inhibitors        Physical Exam  ED Triage Vitals   Temp Heart Rate Resp BP SpO2   07/04/25 0033 07/04/25 0033 07/04/25 0033 07/04/25 0040 07/04/25 0033   99.3 °F (37.4 °C) 93 14 139/88 96 %      Temp src Heart Rate Source Patient Position BP Location FiO2 (%)   07/04/25 0033 -- 07/04/25 0040 07/04/25 0040 --   Oral  Lying Left arm      Physical Exam  Constitutional:       Appearance: Normal appearance.   HENT:      Head: Atraumatic.   Eyes:      Conjunctiva/sclera: Conjunctivae normal.   Cardiovascular:      Rate  and Rhythm: Normal rate and regular rhythm.      Heart sounds: Murmur heard.      Systolic murmur is present.   Pulmonary:      Effort: Pulmonary effort is normal. No respiratory distress.      Breath sounds: Normal breath sounds.   Abdominal:      Tenderness: There is no abdominal tenderness. There is no guarding or rebound.   Skin:     Capillary Refill: Capillary refill takes less than 2 seconds.   Neurological:      Mental Status: He is alert and oriented to person, place, and time.           Medications Given in ER:   Medications   sodium chloride 0.9 % flush 10 mL (has no administration in time range)   nitroglycerin (NITROSTAT) SL tablet 0.4 mg (0.4 mg Sublingual Given 7/4/25 0104)   aspirin tablet 325 mg (325 mg Oral Given 7/4/25 0104)         Orders Placed:  Orders Placed This Encounter   Procedures    XR Chest 1 View    [place] Draw    Comprehensive Metabolic Panel    High Sensitivity Troponin T    CBC Auto Differential    High Sensitivity Troponin T 1Hr    NPO Diet NPO Type: Strict NPO    Undress & Gown    Continuous Pulse Oximetry    LHA (on-call MD unless specified) Details    Cardiology (on-call MD unless specified)    LHA (on-call MD unless specified) Details    Oxygen Therapy- Nasal Cannula; Titrate 1-6 LPM Per SpO2; 90 - 95%    ECG 12 Lead ED Triage Standing Order; Chest Pain    ECG 12 Lead ED Triage Standing Order; Chest Pain    Insert Peripheral IV    Initiate Observation Status    CBC & Differential    Green Top (Gel)    Lavender Top    Gold Top - SST    Light Blue Top         Outpatient Medication Management:   Current Facility-Administered Medications Ordered in Epic   Medication Dose Route Frequency Provider Last Rate Last Admin    nitroglycerin (NITROSTAT) SL tablet 0.4 mg  0.4 mg Sublingual Q5 Min PRN Nithya Lucero MD   0.4 mg at 07/04/25 0104    sodium chloride 0.9 % flush 10 mL  10 mL Intravenous PRN Nithya Lucero MD         Current Outpatient Medications Ordered in Epic    Medication Sig Dispense Refill    allopurinol (Zyloprim) 100 MG tablet Take 1 tablet by mouth Daily. 30 tablet 5    amLODIPine (NORVASC) 10 MG tablet Take 1 tablet by mouth Daily. (Patient taking differently: Take 10 mg by mouth Every Morning.) 30 tablet 5    calcitriol (ROCALTROL) 0.25 MCG capsule Take 0.25 mcg by mouth Daily. ONLY TAKES ON MON, WED, FRI WITH DIALYSIS      Chlorhexidine Gluconate 2 % pads Apply  topically. As directed pre op      cinacalcet (Sensipar) 60 MG tablet Take 60 mg by mouth Daily.      Methoxy PEG-Epoetin Beta (MIRCERA IJ) Inject 75 mcg under the skin into the appropriate area as directed. WITH DIALYSIS      metoprolol succinate XL (TOPROL-XL) 50 MG 24 hr tablet Take 1 tablet by mouth Daily. (Patient taking differently: Take 50 mg by mouth Every Morning.) 30 tablet 5    sevelamer (RENVELA) 800 MG tablet Take 800 mg by mouth 3 (Three) Times a Day With Meals.      Sildenafil Citrate (VIAGRA PO) Take 0.5 tablets by mouth As Needed. HOLD FOR 48 HRS PRIOR TO SURGERY             Medical Decision Making:  All labs have been independently interpreted by me.  All radiology studies have been reviewed by me. All EKGs have been independently viewed and interpreted by me.  Discussion below represents my analysis of pertinent findings related to patient's condition, differential diagnosis, treatment plan and final disposition.    Differential Diagnosis includes but not limited to: CS, acute aortic syndrome, pneumonia, pneumothorax    Review of prior external notes (non-ED) -and- Review of prior external test results outside of this encounter: I reviewed the cardiothoracic surgery office visit note from 6/30/2025.  Patient was seen for a thoracic aortic aneurysm and plan was for repeat chest CT in 1 year.    Labs Results:  Recent Results (from the past 24 hours)   ECG 12 Lead ED Triage Standing Order; Chest Pain    Collection Time: 07/04/25 12:37 AM   Result Value Ref Range    QT Interval 384 ms     QTC Interval 471 ms   Comprehensive Metabolic Panel    Collection Time: 07/04/25 12:54 AM    Specimen: Blood   Result Value Ref Range    Glucose 107 (H) 65 - 99 mg/dL    BUN 47.0 (H) 8.0 - 23.0 mg/dL    Creatinine 10.44 (H) 0.76 - 1.27 mg/dL    Sodium 138 136 - 145 mmol/L    Potassium 4.5 3.5 - 5.2 mmol/L    Chloride 96 (L) 98 - 107 mmol/L    CO2 29.7 (H) 22.0 - 29.0 mmol/L    Calcium 8.0 (L) 8.6 - 10.5 mg/dL    Total Protein 7.5 6.0 - 8.5 g/dL    Albumin 4.6 3.5 - 5.2 g/dL    ALT (SGPT) 15 1 - 41 U/L    AST (SGOT) 12 1 - 40 U/L    Alkaline Phosphatase 82 39 - 117 U/L    Total Bilirubin 0.6 0.0 - 1.2 mg/dL    Globulin 2.9 gm/dL    A/G Ratio 1.6 g/dL    BUN/Creatinine Ratio 4.5 (L) 7.0 - 25.0    Anion Gap 12.3 5.0 - 15.0 mmol/L    eGFR 5.1 (L) >60.0 mL/min/1.73   High Sensitivity Troponin T    Collection Time: 07/04/25 12:54 AM    Specimen: Blood   Result Value Ref Range    HS Troponin T 78 (C) <22 ng/L   Green Top (Gel)    Collection Time: 07/04/25 12:54 AM   Result Value Ref Range    Extra Tube Hold for add-ons.    Lavender Top    Collection Time: 07/04/25 12:54 AM   Result Value Ref Range    Extra Tube hold for add-on    Gold Top - SST    Collection Time: 07/04/25 12:54 AM   Result Value Ref Range    Extra Tube Hold for add-ons.    Light Blue Top    Collection Time: 07/04/25 12:54 AM   Result Value Ref Range    Extra Tube Hold for add-ons.    CBC Auto Differential    Collection Time: 07/04/25 12:54 AM    Specimen: Blood   Result Value Ref Range    WBC 4.91 3.40 - 10.80 10*3/mm3    RBC 3.51 (L) 4.14 - 5.80 10*6/mm3    Hemoglobin 10.4 (L) 13.0 - 17.7 g/dL    Hematocrit 33.3 (L) 37.5 - 51.0 %    MCV 94.9 79.0 - 97.0 fL    MCH 29.6 26.6 - 33.0 pg    MCHC 31.2 (L) 31.5 - 35.7 g/dL    RDW 14.3 12.3 - 15.4 %    RDW-SD 48.8 37.0 - 54.0 fl    MPV 8.8 6.0 - 12.0 fL    Platelets 148 140 - 450 10*3/mm3    Neutrophil % 70.1 42.7 - 76.0 %    Lymphocyte % 12.4 (L) 19.6 - 45.3 %    Monocyte % 14.7 (H) 5.0 - 12.0 %    Eosinophil %  2.2 0.3 - 6.2 %    Basophil % 0.4 0.0 - 1.5 %    Immature Grans % 0.2 0.0 - 0.5 %    Neutrophils, Absolute 3.44 1.70 - 7.00 10*3/mm3    Lymphocytes, Absolute 0.61 (L) 0.70 - 3.10 10*3/mm3    Monocytes, Absolute 0.72 0.10 - 0.90 10*3/mm3    Eosinophils, Absolute 0.11 0.00 - 0.40 10*3/mm3    Basophils, Absolute 0.02 0.00 - 0.20 10*3/mm3    Immature Grans, Absolute 0.01 0.00 - 0.05 10*3/mm3    nRBC 0.0 0.0 - 0.2 /100 WBC   High Sensitivity Troponin T 1Hr    Collection Time: 07/04/25  2:05 AM    Specimen: Blood   Result Value Ref Range    HS Troponin T 76 (C) <22 ng/L    Troponin T Numeric Delta -2 ng/L    Troponin T % Delta -3 Abnormal if >/= 20%     Lab Comments:  My independent interpretation of the above labs: Consistent with known history of CKD, elevated troponin level      Radiology:  XR Chest 1 View  Result Date: 7/4/2025  CXR ONE VIEW  HISTORY: Chest Pain Triage Protocol  COMPARISON: None  TECHNIQUE: single portable AP       There is cardiomegaly. There is moderate elevation of the right hemidiaphragm.  There is no consolidation, effusion or pneumothorax. There is no suspicious nodule.    This report was finalized on 7/4/2025 1:15 AM by Dr. Bahman Delatorre M.D on Workstation: [id number]      Radiology Comments:  I ordered the above imaging and reviewed the results.    My independent interpretation of the cxr:      EKG Interpreted by me: Sinus rhythm, first-degree AV block, LVH with repolarization changes      Rationale:  This is a 59-year-old male who is presenting today's with complaints of chest pain.  He looks slightly uncomfortable, but is not acutely toxic appearing.  He presents afebrile with unremarkable vital signs.  Cardiopulmonary exam is notable for a murmur, but otherwise fairly benign.    He was evaluated with an EKG, which looks to be more of an LVH with strain pattern.  He was evaluated with troponins, which are elevated, but this is also in the setting of CKD.  He has never had troponins  previously to compare.  He does have an elevated heart score and has baseline risk factors.  Furthermore, he was evaluated with a nuc med stress test from 6/9/2025 at [facility].  Notable for an area of decreased perfusion in the basal to mid inferior, basal inferior septal, basal and mid inferior lateral segments, consistent with predominant scar given minimal improvement at rest.  He also has had a CTA of the chest which has been notable for coronary artery disease.  Will plan on consulting cardiology as I suspect he will need further evaluation.  Will give nitroglycerin for pain and assess response.    Clinical Scores:         HEART Score: 7                         He otherwise has a Wells score of 0 and PE is not suspected today  Wells' Criteria for Pulmonary Embolism - MDCalc  Calculated on Jul 04 2025 3:44 AM  0.0 points -> Low risk group: 1.3% chance of PE in an ED population. Another study assigned scores <= 4 as “PE Unlikely” and had a 3% incidence of PE.    Chest x-ray did not demonstrate any other acute cardiopulmonary process as emergent cause of symptoms including pneumomediastinum, widened mediastinum, acute infiltrate or pneumothorax.     Patient patient is known to have a thoracic aortic aneurysm.  However, his symptoms really do not sound suspicious for acute aortic syndrome.  If symptoms fail to improve with standard therapy, may need to consider further imaging.    Progress Notes:  3:35 AM EDT: I saw and reevaluated the patient.  Resting happily in no acute distress.  No pain after nitroglycerin.  No further imaging required at this juncture.  I spoke with the patient about his ED work up and diagnosis. I informed the patient that he will be admitted for further evaluation/treatment. All questions answered.      Consult:  3:01 AM EDT: Discussed case with Dr. Boyce.  Reviewed history, exam, results and treatments.  Will consult, will plan on admission to medicine.  Does not recommend heparin drip  at this time.  3:32 AM EDT: I spoke with PUSHPA Prado with [facility].  Will admit to Dr. Fox           Complexity of Care:  The patient requires admission.    Diagnosis:  Final diagnoses:   Precordial pain         Parts of this note may be an electronic transcription/translation of spoken language to printed text using the Dragon dictation system        Provider Note Signed by:     Nithya Lucero MD  07/04/25 0346

## 2025-07-04 NOTE — ED NOTES
Nursing report ED to floor  Alonso Torres Sr.  59 y.o.  male    HPI :  HPI  Stated Reason for Visit: Pt to ED from home via pv. pt reports CP x 1pm today, wraps around his chest, worsens with a deep breath. Pt reports pain is sharp.  History Obtained From: patient    Chief Complaint  Chief Complaint   Patient presents with    Chest Pain       Admitting doctor:   Rivera Fox DO    Admitting diagnosis:   The encounter diagnosis was Precordial pain.    Code status:   Current Code Status       Date Active Code Status Order ID Comments User Context       Not on file            Allergies:   Nsaids and Ace inhibitors    Isolation:   No active isolations    Intake and Output  No intake or output data in the 24 hours ending 07/04/25 0344    Weight:   There were no vitals filed for this visit.    Most recent vitals:   Vitals:    07/04/25 0200 07/04/25 0245 07/04/25 0246 07/04/25 0300   BP: 136/83 137/78  136/83   BP Location:       Patient Position:       Pulse: 83  81 77   Resp:       Temp:       TempSrc:       SpO2: 98%  99% 98%       Active LDAs/IV Access:   Lines, Drains & Airways       Active LDAs       Name Placement date Placement time Site Days    Peripheral IV 07/04/25 0054 20 G Left Antecubital 07/04/25  0054  Antecubital  less than 1    Peritoneal Dialysis Catheter 02/25/21  1220  --  1589                    Labs (abnormal labs have a star):   Labs Reviewed   COMPREHENSIVE METABOLIC PANEL - Abnormal; Notable for the following components:       Result Value    Glucose 107 (*)     BUN 47.0 (*)     Creatinine 10.44 (*)     Chloride 96 (*)     CO2 29.7 (*)     Calcium 8.0 (*)     BUN/Creatinine Ratio 4.5 (*)     eGFR 5.1 (*)     All other components within normal limits    Narrative:     GFR Categories in Chronic Kidney Disease (CKD)              GFR Category          GFR (mL/min/1.73)    Interpretation  G1                    90 or greater        Normal or high (1)  G2                    60-89                Mild  decrease (1)  G3a                   45-59                Mild to moderate decrease  G3b                   30-44                Moderate to severe decrease  G4                    15-29                Severe decrease  G5                    14 or less           Kidney failure    (1)In the absence of evidence of kidney disease, neither GFR category G1 or G2 fulfill the criteria for CKD.    eGFR calculation 2021 CKD-EPI creatinine equation, which does not include race as a factor   TROPONIN - Abnormal; Notable for the following components:    HS Troponin T 78 (*)     All other components within normal limits    Narrative:     High Sensitive Troponin T Reference Range:  <14.0 ng/L- Negative Female for AMI  <22.0 ng/L- Negative Male for AMI  >=14 - Abnormal Female indicating possible myocardial injury.  >=22 - Abnormal Male indicating possible myocardial injury.   Clinicians would have to utilize clinical acumen, EKG, Troponin, and serial changes to determine if it is an Acute Myocardial Infarction or myocardial injury due to an underlying chronic condition.        CBC WITH AUTO DIFFERENTIAL - Abnormal; Notable for the following components:    RBC 3.51 (*)     Hemoglobin 10.4 (*)     Hematocrit 33.3 (*)     MCHC 31.2 (*)     Lymphocyte % 12.4 (*)     Monocyte % 14.7 (*)     Lymphocytes, Absolute 0.61 (*)     All other components within normal limits   HIGH SENSITIVITIY TROPONIN T 1HR - Abnormal; Notable for the following components:    HS Troponin T 76 (*)     All other components within normal limits    Narrative:     High Sensitive Troponin T Reference Range:  <14.0 ng/L- Negative Female for AMI  <22.0 ng/L- Negative Male for AMI  >=14 - Abnormal Female indicating possible myocardial injury.  >=22 - Abnormal Male indicating possible myocardial injury.   Clinicians would have to utilize clinical acumen, EKG, Troponin, and serial changes to determine if it is an Acute Myocardial Infarction or myocardial injury due to an  underlying chronic condition.        RAINBOW DRAW    Narrative:     The following orders were created for panel order [place] Draw.  Procedure                               Abnormality         Status                     ---------                               -----------         ------                     Green Top (Gel)[[id number]]                                  Final result               Lavender Top[[id number]]                                     Final result               Gold Top - SST[[id number]]                                   Final result               Light Blue Top[[id number]]                                   Final result                 Please view results for these tests on the individual orders.   CBC AND DIFFERENTIAL    Narrative:     The following orders were created for panel order CBC & Differential.  Procedure                               Abnormality         Status                     ---------                               -----------         ------                     CBC Auto Differential[[id number]]        Abnormal            Final result                 Please view results for these tests on the individual orders.   GREEN TOP   LAVENDER TOP   GOLD TOP - SST   LIGHT BLUE TOP       EKG:   ECG 12 Lead ED Triage Standing Order; Chest Pain   Preliminary Result   HEART RATE=91  bpm   RR Ypqsrimw=430  ms   CT Vxiskwhm=541  ms   P Horizontal Axis=8  deg   P Front Axis=64  deg   QRSD Jkdmormp=074  ms   QT Zhxvrjdb=169  ms   IBvQ=829  ms   QRS Axis=-34  deg   T Wave Axis=90  deg   - ABNORMAL ECG -   Sinus rhythm   Prolonged CT interval   LVH with secondary repolarization abnormality   When compared with ECG of 10-Aug-2021 14:22:46,   New conduction abnormality   Date and Time of Study:2025-07-04 00:37:30          Meds given in ED:   Medications   sodium chloride 0.9 % flush 10 mL (has no administration in time range)   nitroglycerin (NITROSTAT) SL tablet 0.4 mg (0.4 mg Sublingual Given 7/4/25 0104)    aspirin tablet 325 mg (325 mg Oral Given 7/4/25 0104)       Imaging results:  XR Chest 1 View  Result Date: 7/4/2025   There is cardiomegaly. There is moderate elevation of the right hemidiaphragm.  There is no consolidation, effusion or pneumothorax. There is no suspicious nodule.    This report was finalized on 7/4/2025 1:15 AM by Dr. Bahman Delatorre M.D on Workstation: [id number]        Ambulatory status:   - up ad anjum    Social issues:   Social History     Socioeconomic History    Marital status:    Tobacco Use    Smoking status: Never    Smokeless tobacco: Never   Vaping Use    Vaping status: Never Used   Substance and Sexual Activity    Alcohol use: Never    Drug use: Never    Sexual activity: Defer       Peripheral Neurovascular  Peripheral Neurovascular (Adult)  Peripheral Neurovascular WDL: WDL    Neuro Cognitive  Neuro Cognitive (Adult)  Cognitive/Neuro/Behavioral WDL: WDL    Learning  Learning Assessment  Learning Readiness and Ability: no barriers identified    Respiratory  Respiratory  Airway WDL: WDL  Additional Documentation: Breath Sounds (Group)  Respiratory WDL  Respiratory WDL: .WDL except, rhythm/pattern  Rhythm/Pattern, Respiratory: shortness of breath, pattern regular, unlabored  Breath Sounds  All Lung Fields Breath Sounds: RUL, RLL  RUL Breath Sounds: Anterior:, Posterior:, diminished, crackles, fine    Abdominal Pain       Pain Assessments  Pain (Adult)  (0-10) Pain Rating: Rest: 5  (0-10) Pain Rating: Activity: 6  Pain Location: chest  Pain Side/Orientation: generalized  Response to Pain Interventions: interventions effective per patient    NIH Stroke Scale       Shannon Karimi RN  07/04/25 03:44 EDT

## 2025-07-05 VITALS
OXYGEN SATURATION: 100 % | BODY MASS INDEX: 27.33 KG/M2 | HEART RATE: 78 BPM | RESPIRATION RATE: 17 BRPM | SYSTOLIC BLOOD PRESSURE: 101 MMHG | WEIGHT: 212.9 LBS | DIASTOLIC BLOOD PRESSURE: 68 MMHG | TEMPERATURE: 97.9 F

## 2025-07-05 PROBLEM — I21.4 NSTEMI (NON-ST ELEVATED MYOCARDIAL INFARCTION): Status: RESOLVED | Noted: 2025-07-04 | Resolved: 2025-07-05

## 2025-07-05 PROBLEM — R79.89 ELEVATED TROPONIN: Status: ACTIVE | Noted: 2025-07-05

## 2025-07-05 LAB
ANION GAP SERPL CALCULATED.3IONS-SCNC: 12.6 MMOL/L (ref 5–15)
BUN SERPL-MCNC: 29 MG/DL (ref 8–23)
BUN/CREAT SERPL: 3.7 (ref 7–25)
CALCIUM SPEC-SCNC: 8.3 MG/DL (ref 8.6–10.5)
CHLORIDE SERPL-SCNC: 95 MMOL/L (ref 98–107)
CHOLEST SERPL-MCNC: 121 MG/DL (ref 0–200)
CHOLEST SERPL-MCNC: NORMAL MG/DL
CO2 SERPL-SCNC: 27.4 MMOL/L (ref 22–29)
CREAT SERPL-MCNC: 7.76 MG/DL (ref 0.76–1.27)
DEPRECATED RDW RBC AUTO: 50.6 FL (ref 37–54)
EGFRCR SERPLBLD CKD-EPI 2021: 7.3 ML/MIN/1.73
ERYTHROCYTE [DISTWIDTH] IN BLOOD BY AUTOMATED COUNT: 15.1 % (ref 12.3–15.4)
GEN 5 1HR TROPONIN T REFLEX: 93 NG/L
GLUCOSE SERPL-MCNC: 104 MG/DL (ref 65–99)
HBA1C MFR BLD: 4.7 % (ref 4.8–5.6)
HCT VFR BLD AUTO: 29 % (ref 37.5–51)
HDLC SERPL-MCNC: 54 MG/DL (ref 40–60)
HDLC SERPL-MCNC: NORMAL MG/DL
HGB BLD-MCNC: 9.4 G/DL (ref 13–17.7)
LDLC SERPL CALC-MCNC: 55 MG/DL (ref 0–100)
LDLC SERPL CALC-MCNC: NORMAL MG/DL
LDLC/HDLC SERPL: 1.05 {RATIO}
LDLC/HDLC SERPL: NORMAL {RATIO}
MCH RBC QN AUTO: 30.1 PG (ref 26.6–33)
MCHC RBC AUTO-ENTMCNC: 32.4 G/DL (ref 31.5–35.7)
MCV RBC AUTO: 92.9 FL (ref 79–97)
PLATELET # BLD AUTO: 114 10*3/MM3 (ref 140–450)
PMV BLD AUTO: 9.1 FL (ref 6–12)
POTASSIUM SERPL-SCNC: 3.8 MMOL/L (ref 3.5–5.2)
QT INTERVAL: 456 MS
QTC INTERVAL: 505 MS
RBC # BLD AUTO: 3.12 10*6/MM3 (ref 4.14–5.8)
SODIUM SERPL-SCNC: 135 MMOL/L (ref 136–145)
TRIGL SERPL-MCNC: 52 MG/DL (ref 0–150)
TRIGL SERPL-MCNC: NORMAL MG/DL
TROPONIN T % DELTA: 0
TROPONIN T NUMERIC DELTA: 0 NG/L
TROPONIN T SERPL HS-MCNC: 93 NG/L
VLDLC SERPL-MCNC: 12 MG/DL (ref 5–40)
VLDLC SERPL-MCNC: NORMAL MG/DL
WBC NRBC COR # BLD AUTO: 3.07 10*3/MM3 (ref 3.4–10.8)

## 2025-07-05 PROCEDURE — 93005 ELECTROCARDIOGRAM TRACING: CPT | Performed by: INTERNAL MEDICINE

## 2025-07-05 PROCEDURE — 99214 OFFICE O/P EST MOD 30 MIN: CPT | Performed by: NURSE PRACTITIONER

## 2025-07-05 PROCEDURE — 83036 HEMOGLOBIN GLYCOSYLATED A1C: CPT | Performed by: HOSPITALIST

## 2025-07-05 PROCEDURE — G0378 HOSPITAL OBSERVATION PER HR: HCPCS

## 2025-07-05 PROCEDURE — 85027 COMPLETE CBC AUTOMATED: CPT | Performed by: HOSPITALIST

## 2025-07-05 PROCEDURE — 80061 LIPID PANEL: CPT | Performed by: STUDENT IN AN ORGANIZED HEALTH CARE EDUCATION/TRAINING PROGRAM

## 2025-07-05 PROCEDURE — 80048 BASIC METABOLIC PNL TOTAL CA: CPT | Performed by: HOSPITALIST

## 2025-07-05 PROCEDURE — 80061 LIPID PANEL: CPT | Performed by: HOSPITALIST

## 2025-07-05 PROCEDURE — 84484 ASSAY OF TROPONIN QUANT: CPT | Performed by: INTERNAL MEDICINE

## 2025-07-05 PROCEDURE — 82668 ASSAY OF ERYTHROPOIETIN: CPT | Performed by: HOSPITALIST

## 2025-07-05 PROCEDURE — G0257 UNSCHED DIALYSIS ESRD PT HOS: HCPCS

## 2025-07-05 RX ORDER — SEVELAMER CARBONATE 800 MG/1
800 TABLET, FILM COATED ORAL
Status: DISCONTINUED | OUTPATIENT
Start: 2025-07-05 | End: 2025-07-05 | Stop reason: HOSPADM

## 2025-07-05 RX ADMIN — CALCITRIOL 0.25 MCG: 0.25 CAPSULE ORAL at 12:50

## 2025-07-05 NOTE — NURSING NOTE
Dialysis complete, removed 1.5 liters with this treatment and no complications noted.  Pre and post vitals are in epic.  Report given to Danielle Bustillos r.n.

## 2025-07-05 NOTE — DISCHARGE SUMMARY
Patient Name: Alonso Torres .  : 1965  MRN: [id number]    Date of Admission: 2025  Date of Discharge:  2025  Primary Care Physician: Sherrell Littlejohn PA      Chief Complaint:   Chest Pain      Discharge Diagnoses     Active Hospital Problems    Diagnosis  POA    **Elevated troponin [R79.89]  Unknown    Abnormal cardiovascular stress test [R94.39]  Yes    Heart failure with reduced ejection fraction [I50.20]  Yes    Aortic stenosis [I35.0]  Yes    Anemia in chronic kidney disease [N18.9, D63.1]  Yes    End stage renal disease [N18.6]  Yes    PAF (paroxysmal atrial fibrillation) [I48.0]  Yes    Hypertension [I10]  Yes      Resolved Hospital Problems   No resolved problems to display.        Hospital Course   Mr. Torres is a 59-year-old male with history of HTN, ESRD on home dialysis, and paroxysmal atrial fibrillation not on AC, heart failure, presents with chest pain. The pain began around noon the prior day while at rest and was described as a sharp discomfort across the upper chest, lasting several hours and relieved with nitroglycerin in the ED. He denies current chest pain, shortness of breath, nausea, diaphoresis, palpitations, or syncope. He performs home dialysis 5 days/week, with last session on 7/3. Recent nuclear stress test from transplant evaluation showed moderately reduced LV systolic function with stress-induced hypoperfusion and underlying scar. Echocardiogram in May showed EF 45-50%, diffuse hypokinesis, severe left atrial dilation, and pulmonary hypertension.         Chest pain  Elevated troponin  Heart failure with reduced ejection fraction (HFrEF)  PAF  -EKG demonstrates no ST elevations/acute ischemic changes, sinus rhythm.  -Troponins was evaluated at 76 initially, trended up and remained stable around 93.  -Cardiology was consulted.Cardiac cath was recommended but patient preferred to have this with his primary cardiologist   - No further episodes of chest pain since  admission  - Patient was not on aspirin or statin prior to admission, cardiology deferred  to primary cardiology for initiation if indicated.    -On Toprol-XL outpatient however not taking consistently due to intermittent low blood pressure.    -Not on anticoagulation outpatient  -Goal-directed medical therapy limited due to soft BP/ESRD.  - Will need follow-up with primary cardiology as soon as possible.         At the time of discharge patient was told to take all medications as prescribed, keep all follow-up appointments, and call their doctor or return to the hospital with any worsening or concerning symptoms.             Day of Discharge     Subjective:  Patient seen in dialysis unit.  Denies further episodes of chest pain.  No shortness of breath, palpitations.  Reports he is doing well, wants to be discharged home.    Physical Exam:  Temp:  [96.8 °F (36 °C)-98.1 °F (36.7 °C)] 97.9 °F (36.6 °C)  Heart Rate:  [71-82] 78  Resp:  [16-20] 17  BP: (101-161)/(68-83) 101/68  Body mass index is 27.33 kg/m².  Physical Exam    General: Alert and oriented x3, no acute distress  HEENT: Normocephalic, atraumatic  CV: Regular rate and rhythm, + murmur  Lungs: Clear to auscultation bilaterally, no crackles or wheezes  Abdomen: Soft, nontender, nondistended  Extremities: No significant peripheral edema , no cyanosis     Consultants     Consult Orders (all) (From admission, onward)       Start     Ordered    07/04/25 0702  Inpatient Cardiology Consult  IN AM        Specialty:  Cardiology  Provider:  Omari Boyce MD    07/04/25 0505    07/04/25 0702  Inpatient Nephrology Consult  IN AM        Specialty:  Nephrology  Provider:  Roderick Edge MD    07/04/25 0505    07/04/25 0701  Inpatient Infection Control Nurse Consult  Once        Provider:  (Not yet assigned)    07/04/25 0701    07/04/25 0303  [facility] (on-call MD unless specified) Details  Once,   Status:  Canceled        Specialty:  Hospitalist  Provider:  (Not yet  assigned)    07/04/25 0302    07/04/25 0210  [facility] (on-call MD unless specified) Details  Once,   Status:  Canceled        Specialty:  Hospitalist  Provider:  (Not yet assigned)    07/04/25 0209 07/04/25 0210  Cardiology (on-call MD unless specified)  Once,   Status:  Canceled        Specialty:  Cardiology  Provider:  Omari Boyce MD    07/04/25 0209                  Procedures     * Surgery not found *      Imaging Results (All)       Procedure Component Value Units Date/Time    XR Chest 1 View [[id number]] Collected: 07/04/25 0114     Updated: 07/04/25 0118    Narrative:      CXR ONE VIEW      HISTORY: Chest Pain Triage Protocol     COMPARISON: None     TECHNIQUE: single portable AP       Impression:         There is cardiomegaly. There is moderate elevation of the right  hemidiaphragm.     There is no consolidation, effusion or pneumothorax. There is no  suspicious nodule.           This report was finalized on 7/4/2025 1:15 AM by Dr. Bahman Delatorre M.D  on Workstation: [id number]                 Pertinent Labs     Results from last 7 days   Lab Units 07/05/25  0624 07/04/25 0923 07/04/25  0054   WBC 10*3/mm3 3.07* 3.56 4.91   HEMOGLOBIN g/dL 9.4* 9.5* 10.4*   PLATELETS 10*3/mm3 114* 128* 148     Results from last 7 days   Lab Units 07/05/25  1251 07/04/25  0923 07/04/25  0054 06/29/25  0000   SODIUM mmol/L 135* 136 138  --    POTASSIUM mmol/L 3.8 4.5 4.5  --    CHLORIDE mmol/L 95* 96* 96*  --    CO2 mmol/L 27.4 27.0 29.7*  --    BUN mg/dL 29.0* 50.0* 47.0* 66*   CREATININE mg/dL 7.76* 11.71* 10.44*  --    GLUCOSE mg/dL 104* 87 107*  --    CrCl cannot be calculated (Unknown ideal weight.).  Results from last 7 days   Lab Units 07/04/25  0923 07/04/25  0054   ALBUMIN g/dL 4.0 4.6   BILIRUBIN mg/dL 0.7 0.6   ALK PHOS U/L 65 82   AST (SGOT) U/L 11 12   ALT (SGPT) U/L 13 15     Results from last 7 days   Lab Units 07/05/25  1251 07/04/25  0923 07/04/25  0054   CALCIUM mg/dL 8.3* 7.5* 8.0*   ALBUMIN g/dL  --   4.0 4.6       Results from last 7 days   Lab Units 07/05/25  1420 07/05/25  1251 07/04/25  0923 07/04/25  0205   HSTROP T ng/L 93* 93* 85* 76*       Results from last 7 days   Lab Units 07/05/25  1251   CHOLESTEROL mg/dL 121   TRIGLYCERIDES mg/dL 52   HDL CHOL mg/dL 54   LDL CHOL mg/dL 55             Test Results Pending at Discharge     Pending Labs       Order Current Status    Erythropoietin In process            Discharge Details        Discharge Medications        Continue These Medications        Instructions Start Date   calcitriol 0.25 MCG capsule  Commonly known as: ROCALTROL   0.25 mcg, Daily      metoprolol succinate XL 50 MG 24 hr tablet  Commonly known as: TOPROL-XL   50 mg, Oral, Daily      MIRCERA IJ   75 mcg      Sensipar 60 MG tablet  Generic drug: cinacalcet   60 mg, Daily      sevelamer 800 MG tablet  Commonly known as: RENVELA   800 mg, 3 Times Daily With Meals      VIAGRA PO   0.5 tablets, Oral, As Needed, HOLD FOR 48 HRS PRIOR TO SURGERY             Stop These Medications      amLODIPine 10 MG tablet  Commonly known as: NORVASC     Chlorhexidine Gluconate 2 % pads            ASK your doctor about these medications        Instructions Start Date   allopurinol 100 MG tablet  Commonly known as: Zyloprim   100 mg, Oral, Daily               Allergies   Allergen Reactions    Nsaids Other (See Comments)     DUE TO CHRONIC KIDNEY DISEASE       Discharge Disposition:  Home or Self Care      Discharge Diet:  Diet Order   Procedures    Diet: Cardiac, Diabetic; Healthy Heart (2-3 Na+); Consistent Carbohydrate; Fluid Consistency: Thin (IDDSI 0)       Discharge Activity:       CODE STATUS:    Code Status and Medical Interventions: CPR (Attempt to Resuscitate); Full Support   Ordered at: 07/04/25 0505     Code Status (Patient has no pulse and is not breathing):    CPR (Attempt to Resuscitate)     Medical Interventions (Patient has pulse or is breathing):    Full Support       No future appointments.    Follow-up Information       Piyush Carty MD Follow up in 1 week(s).    Specialty: Cardiothoracic Surgery  Contact information:  [address]  [facility] [id number] [phone]               Geovanna Carty MD .    Specialties: Obstetrics and Gynecology, Gynecology  Contact information:  [address]  [facility] [address]  [phone]               Sherrell Littlejohn PA. Schedule an appointment as soon as possible for a visit in 1 week(s).    Specialty: Nurse Practitioner  Contact information:  [address] [facility] [address]  [phone]                             Time Spent on Discharge:  Greater than 30 minutes      Anne Bolanos MD  [facility]  07/05/25  15:11 EDT

## 2025-07-05 NOTE — PROGRESS NOTES
CC: Chest pain and elevated troponin    Interval History: No chest pain today.  EKG this morning no change      Vital Signs  Temp:  [96.8 °F (36 °C)-98.1 °F (36.7 °C)] 97.9 °F (36.6 °C)  Heart Rate:  [71-82] 78  Resp:  [16-20] 17  BP: (101-161)/(68-83) 101/68    Intake/Output Summary (Last 24 hours) at 7/5/2025 1349  Last data filed at 7/5/2025 1152  Gross per 24 hour   Intake 0 ml   Output 1500 ml   Net -1500 ml     Flowsheet Rows      Flowsheet Row First Filed Value   Admission Height --   Admission Weight 96.6 kg (212 lb 14.4 oz) Documented at 07/04/2025 0535            PHYSICAL EXAM:  General: No acute distress  Resp:NL Rate, unlabored, clear  CV:NL rate and rhythm, NL PMI, Nl S1 and S2, no Murmur, no gallop, no rub, No JVD. Normal pedal pulses  ABD:Nl sounds, no masses or tenderness, nondistended, no guarding or rebound  Neuro: alert,cooperative and oriented  Extr: No edema or cyanosis, moves all extremities      Results Review:    Results from last 7 days   Lab Units 07/05/25  1251   SODIUM mmol/L 135*   POTASSIUM mmol/L 3.8   CHLORIDE mmol/L 95*   CO2 mmol/L 27.4   BUN mg/dL 29.0*   CREATININE mg/dL 7.76*   GLUCOSE mg/dL 104*   CALCIUM mg/dL 8.3*     Results from last 7 days   Lab Units 07/05/25  1251 07/04/25  0923 07/04/25  0205   HSTROP T ng/L 93* 85* 76*     Results from last 7 days   Lab Units 07/05/25  0624   WBC 10*3/mm3 3.07*   HEMOGLOBIN g/dL 9.4*   HEMATOCRIT % 29.0*   PLATELETS 10*3/mm3 114*         Results from last 7 days   Lab Units 07/05/25  1251   CHOLESTEROL mg/dL 121         Results from last 7 days   Lab Units 07/05/25  1251   CHOLESTEROL mg/dL 121   TRIGLYCERIDES mg/dL 52   HDL CHOL mg/dL 54   LDL CHOL mg/dL 55     I reviewed the patient's new clinical results.  I personally viewed and interpreted the patient's EKG/Telemetry data-normal sinus rhythm        Medication Review:   Meds reviewed         Assessment/Plan  1.  59-year-old gentleman with chest pain.  Resolved  2.  Elevated troponin  in the setting of end-stage renal disease.  EKG today shows no significant change  2.  Three-vessel coronary vascular calcification with mild cardiomegaly on CAT scan March 2023 as well as recent abnormal cardiac stress test with predominantly predominant scar with mild to moderate LV dysfunction-following with [facility]  -He was not on aspirin or statin therapy prior to admission.  Would defer starting either of these indicated medication treatment options to his primary cardiologist in outpatient follow-up  - Cardiac cath was recommended but patient prefers to have this with his usual cardiologist  3.  Cardiomyopathy, unknown EF 45-50%.  Normotensive, relatively.  Not on goal-directed medical therapy due to intermittent low blood pressure especially after dialysis.  He was not consistently taking Toprol-XL on his home was due to intermittent low blood pressure.  Hold off on starting carvedilol for this reason and would not be aggressive with other medical therapy   4.  Anemia-stable  5.  Ascending thoracic aorta dilation measuring 4.6 cm on CTA March 2025     Not on goal-directed medical therapy due to intermittent low blood pressure especially after dialysis.  He was not consistently taking Toprol-XL on his home was due to intermittent low blood pressure.  Hold off on starting carvedilol for this reason and would not be aggressive with other medical therapy     He was not on aspirin or statin therapy prior to admission.  Would defer initiation of these outpatient.    No opposition to discharge      Patient will discuss heart catheterization with his primary cardiologist, Dr. Carty.  He also tells me he is scheduled to see cardiology in [place]    MILIND Rincon  07/05/25  13:49 EDT

## 2025-07-05 NOTE — PLAN OF CARE
Problem: Adult Inpatient Plan of Care  Goal: Absence of Hospital-Acquired Illness or Injury  Intervention: Identify and Manage Fall Risk  Recent Flowsheet Documentation  Taken 7/5/2025 0600 by Jacqueline Jaimes RN  Safety Promotion/Fall Prevention: safety round/check completed  Taken 7/5/2025 0400 by Jacquleine Jaimes RN  Safety Promotion/Fall Prevention: safety round/check completed  Taken 7/5/2025 0200 by Jacqueline Jaimes RN  Safety Promotion/Fall Prevention: safety round/check completed  Taken 7/5/2025 0000 by Jacqueline Jaimes RN  Safety Promotion/Fall Prevention: safety round/check completed  Taken 7/4/2025 2021 by Jacqueline Jaimes RN  Safety Promotion/Fall Prevention:   clutter free environment maintained   muscle strengthening facilitated   nonskid shoes/slippers when out of bed   room organization consistent   safety round/check completed  Intervention: Prevent Skin Injury  Recent Flowsheet Documentation  Taken 7/5/2025 0600 by Jacqueline Jaimes RN  Body Position: position changed independently  Taken 7/5/2025 0400 by Jacqueline Jaimes RN  Body Position: position changed independently  Taken 7/5/2025 0200 by Jacqueline Jamies RN  Body Position: position changed independently  Taken 7/5/2025 0000 by Jacqueline Jaimes RN  Body Position: position changed independently  Taken 7/4/2025 2021 by Jacqueline Jaimes RN  Body Position: position changed independently   Goal Outcome Evaluation:

## 2025-07-05 NOTE — PROGRESS NOTES
[facility] Progress Note      Patient Name: Alonso Torres Sr.  : 1965  MRN: [id number]  Primary Care Physician:  Sherrell Littlejohn PA  Date of admission: 2025    Subjective     Interval History:   Follow-up end-stage renal disease    The patient is seen and examined while on dialysis  He is feeling much better, denies any chest pain or shortness of air, no orthopnea or PND, no nausea or vomiting.    Review of Systems:   As noted above    Objective     Vitals:   Temp:  [96.8 °F (36 °C)-98.4 °F (36.9 °C)] 98.1 °F (36.7 °C)  Heart Rate:  [71-78] 78  Resp:  [16-20] 17  BP: (121-134)/(71-81) 134/81    Intake/Output Summary (Last 24 hours) at 2025 1017  Last data filed at 2025 0731  Gross per 24 hour   Intake 0 ml   Output --   Net 0 ml       Physical Exam:    General Appearance: alert, awake, chronically, no acute distress   Skin: warm and dry  HEENT: oral mucosa normal, nonicteric sclera  Neck: No JVD  Lungs: Bilateral rhonchi, breathing effort not labored  Heart: RRR, normal S1 and S2  Abdomen: soft, nontender, nondistended  : no palpable bladder  Extremities: no edema, cyanosis or clubbing, functional AV fistula with good thrill and bruit in the right upper extremity.  Neuro: normal speech and mental status     Blood pressure 148/81, heart rate 76/min, /min ultrafiltration goal 2100 cc per  Scheduled Meds:     calcitriol, 0.25 mcg, Oral, Daily  melatonin, 5 mg, Oral, Nightly  sevelamer, 800 mg, Oral, TID With Meals      IV Meds:        Results Reviewed:   I have personally reviewed the results from the time of this admission to 2025 10:17 EDT     Results from last 7 days   Lab Units 25  0923 25  0054 25  0000   SODIUM mmol/L 136 138  --    POTASSIUM mmol/L 4.5 4.5  --    CHLORIDE mmol/L 96* 96*  --    CO2 mmol/L 27.0 29.7*  --    BUN mg/dL 50.0* 47.0* 66*   CREATININE mg/dL 11.71* 10.44*  --    CALCIUM mg/dL 7.5* 8.0*  --    BILIRUBIN mg/dL  0.7 0.6  --    ALK PHOS U/L 65 82  --    ALT (SGPT) U/L 13 15  --    AST (SGOT) U/L 11 12  --    GLUCOSE mg/dL 87 107*  --        CrCl cannot be calculated (Unknown ideal weight.).                Results from last 7 days   Lab Units 07/05/25  0624 07/04/25  0923 07/04/25  0054   WBC 10*3/mm3 3.07* 3.56 4.91   HEMOGLOBIN g/dL 9.4* 9.5* 10.4*   PLATELETS 10*3/mm3 114* 128* 148             Assessment / Plan     ASSESSMENT:  End-stage renal disease on maintenance hemodialysis on HHD at home he is currently on dialysis his electrolyte and volume status are within acceptable range and he is tolerating the treatment very well.  Hypertension with end-stage renal disease blood pressure is reasonably controlled.  Chest pain being evaluated by cardiology currently chest pain-free.  HFrEF, valvular heart disease and pulmonary hypertension recent echo revealed ejection fraction 45 to 50% and moderate AAS/AI and RVSP 69 with the meter mercury  Anemia of CKD hemoglobin 9.4  Mild thrombocytopenia platelet 114,000.  Status post parathyroidectomy with metabolic bone disease.    PLAN:  Continue the same treatment  Surveillance labs    I reviewed the chart and other providers notes, reviewed labs  I discussed the case with the patient and with the dialysis nurse at the bedside.    Thank you for involving us in the care of Alonso Torres Sr..  Please feel free to call with any questions.    Roderick Edge MD  07/05/25  10:17 EDT    [facility]  [phone]    Please note that portions of this note were completed with a voice recognition program.

## 2025-07-06 NOTE — CASE MANAGEMENT/SOCIAL WORK
Case Management Discharge Note      Final Note: home         Selected Continued Care - Discharged on 7/5/2025 Admission date: 7/4/2025 - Discharge disposition: Home or Self Care      Destination    No services have been selected for the patient.                Durable Medical Equipment    No services have been selected for the patient.                Dialysis/Infusion    No services have been selected for the patient.                Home Medical Care    No services have been selected for the patient.                Therapy    No services have been selected for the patient.                Community Resources    No services have been selected for the patient.                Community & DME    No services have been selected for the patient.                    Transportation Services  Transportation: Private Transportation  Private: Car    Final Discharge Disposition Code: 01 - home or self-care

## 2025-07-07 LAB — EPO SERPL-ACNC: 76.8 MIU/ML (ref 2.6–18.5)

## 2025-08-13 LAB
QT INTERVAL: 456 MS
QTC INTERVAL: 505 MS

## (undated) DEVICE — SUT VIC 3/0 SH 27IN J416H

## (undated) DEVICE — BIOPATCH™ ANTIMICROBIAL DRESSING WITH CHLORHEXIDINE GLUCONATE IS A HYDROPHILLIC POLYURETHANE ABSORPTIVE FOAM WITH CHLORHEXIDINE GLUCONATE (CHG) WHICH INHIBITS BACTERIAL GROWTH UNDER THE DRESSING. THE DRESSING IS INTENDED TO BE USED TO ABSORB EXUDATE, COVER A WOUND CAUSED BY VASCULAR AND NONVASCULAR PERCUTANEOUS MEDICAL DEVICES DURING SURGERY, AS WELL AS REDUCE LOCAL INFECTION AND COLONIZATION OF MICROORGANISMS.: Brand: BIOPATCH

## (undated) DEVICE — CANN O2 ETCO2 FITS ALL CONN CO2 SMPL A/ 7IN DISP LF

## (undated) DEVICE — KT ORCA ORCAPOD DISP STRL

## (undated) DEVICE — ST IRR CYSTO W/SPK 77IN LF

## (undated) DEVICE — GLV SURG BIOGEL LTX PF 7 1/2

## (undated) DEVICE — THE CARR-LOCKE INJECTION NEEDLE IS A SINGLE USE, DISPOSABLE, FLEXIBLE SHEATH INJECTION NEEDLE USED FOR THE INJECTION OF VARIOUS TYPES OF MEDIA THROUGH FLEXIBLE ENDOSCOPES.

## (undated) DEVICE — IRRIGATOR BULB ASEPTO 60CC STRL

## (undated) DEVICE — ENDOPATH XCEL BLADELESS TROCARS WITH STABILITY SLEEVES: Brand: ENDOPATH XCEL

## (undated) DEVICE — PENCL E/S ULTRAVAC TELESCP NOSE HOLSTR 10FT

## (undated) DEVICE — APPL CHLORAPREP HI/LITE 26ML ORNG

## (undated) DEVICE — STPLR SKIN VISISTAT WD 35CT

## (undated) DEVICE — ADAPT CLN BIOGUARD AIR/H2O DISP

## (undated) DEVICE — LN SMPL CO2 SHTRM SD STREAM W/M LUER

## (undated) DEVICE — PATIENT RETURN ELECTRODE, SINGLE-USE, CONTACT QUALITY MONITORING, ADULT, WITH 9FT CORD, FOR PATIENTS WEIGING OVER 33LBS. (15KG): Brand: MEGADYNE

## (undated) DEVICE — TRAP FLD MINIVAC MEGADYNE 100ML

## (undated) DEVICE — SUT VIC 0 TN 27IN DYED JTN0G

## (undated) DEVICE — 3M™ STERI-STRIP™ COMPOUND BENZOIN TINCTURE 40 BAGS/CARTON 4 CARTONS/CASE C1544: Brand: 3M™ STERI-STRIP™

## (undated) DEVICE — SUT MNCRYL PLS ANTIB UD 4/0 PS2 18IN

## (undated) DEVICE — THE TORRENT IRRIGATION SCOPE CONNECTOR IS USED WITH THE TORRENT IRRIGATION TUBING TO PROVIDE IRRIGATION FLUIDS SUCH AS STERILE WATER DURING GASTROINTESTINAL ENDOSCOPIC PROCEDURES WHEN USED IN CONJUNCTION WITH AN IRRIGATION PUMP (OR ELECTROSURGICAL UNIT).: Brand: TORRENT

## (undated) DEVICE — DRSNG SURESITE WNDW 4X4.5

## (undated) DEVICE — DRSNG SURESITE WNDW 2.38X2.75

## (undated) DEVICE — PK PROC MINOR TOWER 40

## (undated) DEVICE — TUBING, SUCTION, 1/4" X 10', STRAIGHT: Brand: MEDLINE

## (undated) DEVICE — ENDOPATH XCEL UNIVERSAL TROCAR STABLILITY SLEEVES: Brand: ENDOPATH XCEL

## (undated) DEVICE — SOL NACL 0.9PCT 1000ML

## (undated) DEVICE — THE SINGLE USE ETRAP – POLYP TRAP IS USED FOR SUCTION RETRIEVAL OF ENDOSCOPICALLY REMOVED POLYPS.: Brand: ETRAP

## (undated) DEVICE — SPNG GZ WOVN 4X4IN 12PLY 10/BX STRL

## (undated) DEVICE — LOU LAP CHOLE: Brand: MEDLINE INDUSTRIES, INC.

## (undated) DEVICE — NDL HYPO PRECISIONGLIDE REG 25G 1 1/2

## (undated) DEVICE — ANTIBACTERIAL UNDYED BRAIDED (POLYGLACTIN 910), SYNTHETIC ABSORBABLE SUTURE: Brand: COATED VICRYL

## (undated) DEVICE — VISUALIZATION SYSTEM: Brand: CLEARIFY

## (undated) DEVICE — SYR LUERLOK 30CC

## (undated) DEVICE — SUT PROLN 0 CT1 30IN 8424H

## (undated) DEVICE — DEV TUNNELING SUBCONTANIOUS

## (undated) DEVICE — SENSR O2 OXIMAX FNGR A/ 18IN NONSTR

## (undated) DEVICE — NDL HYPO PRECISIONGLIDE SHRT 22G 11/2

## (undated) DEVICE — SNAR POLYP SENSATION STDOVL 27 240 BX40

## (undated) DEVICE — ENDOCUT SCISSOR TIP, DISPOSABLE: Brand: RENEW